# Patient Record
Sex: MALE | Race: BLACK OR AFRICAN AMERICAN | Employment: OTHER | URBAN - METROPOLITAN AREA
[De-identification: names, ages, dates, MRNs, and addresses within clinical notes are randomized per-mention and may not be internally consistent; named-entity substitution may affect disease eponyms.]

---

## 2017-01-25 ENCOUNTER — APPOINTMENT (OUTPATIENT)
Dept: GENERAL RADIOLOGY | Age: 77
End: 2017-01-25
Attending: EMERGENCY MEDICINE
Payer: MEDICARE

## 2017-01-25 ENCOUNTER — HOSPITAL ENCOUNTER (EMERGENCY)
Age: 77
Discharge: HOME | End: 2017-01-25
Attending: EMERGENCY MEDICINE | Admitting: EMERGENCY MEDICINE
Payer: MEDICARE

## 2017-01-25 VITALS
HEIGHT: 67 IN | TEMPERATURE: 97.6 F | BODY MASS INDEX: 30.45 KG/M2 | OXYGEN SATURATION: 98 % | HEART RATE: 83 BPM | SYSTOLIC BLOOD PRESSURE: 150 MMHG | RESPIRATION RATE: 20 BRPM | DIASTOLIC BLOOD PRESSURE: 80 MMHG | WEIGHT: 194 LBS

## 2017-01-25 DIAGNOSIS — J01.00 ACUTE MAXILLARY SINUSITIS, RECURRENCE NOT SPECIFIED: Primary | ICD-10-CM

## 2017-01-25 LAB
ALBUMIN SERPL BCP-MCNC: 3.8 G/DL (ref 3.4–5)
ALBUMIN/GLOB SERPL: 0.7 {RATIO} (ref 0.8–1.7)
ALP SERPL-CCNC: 101 U/L (ref 45–117)
ALT SERPL-CCNC: 17 U/L (ref 16–61)
ANION GAP BLD CALC-SCNC: 10 MMOL/L (ref 3–18)
AST SERPL W P-5'-P-CCNC: 16 U/L (ref 15–37)
BASOPHILS # BLD AUTO: 0 K/UL (ref 0–0.06)
BASOPHILS # BLD: 0 % (ref 0–2)
BILIRUB SERPL-MCNC: 0.3 MG/DL (ref 0.2–1)
BNP SERPL-MCNC: 21 PG/ML (ref 0–1800)
BUN SERPL-MCNC: 19 MG/DL (ref 7–18)
BUN/CREAT SERPL: 17 (ref 12–20)
CALCIUM SERPL-MCNC: 9.6 MG/DL (ref 8.5–10.1)
CHLORIDE SERPL-SCNC: 104 MMOL/L (ref 100–108)
CK MB CFR SERPL CALC: 1 % (ref 0–4)
CK MB SERPL-MCNC: 1 NG/ML (ref 0.5–3.6)
CK SERPL-CCNC: 96 U/L (ref 39–308)
CO2 SERPL-SCNC: 26 MMOL/L (ref 21–32)
CREAT SERPL-MCNC: 1.14 MG/DL (ref 0.6–1.3)
DIFFERENTIAL METHOD BLD: ABNORMAL
EOSINOPHIL # BLD: 0.5 K/UL (ref 0–0.4)
EOSINOPHIL NFR BLD: 4 % (ref 0–5)
ERYTHROCYTE [DISTWIDTH] IN BLOOD BY AUTOMATED COUNT: 14.6 % (ref 11.6–14.5)
FLUAV AG NPH QL IA: NEGATIVE
FLUBV AG NOSE QL IA: NEGATIVE
GLOBULIN SER CALC-MCNC: 5.1 G/DL (ref 2–4)
GLUCOSE SERPL-MCNC: 99 MG/DL (ref 74–99)
HCT VFR BLD AUTO: 46.4 % (ref 36–48)
HGB BLD-MCNC: 15.8 G/DL (ref 13–16)
LYMPHOCYTES # BLD AUTO: 25 % (ref 21–52)
LYMPHOCYTES # BLD: 2.9 K/UL (ref 0.9–3.6)
MCH RBC QN AUTO: 30.9 PG (ref 24–34)
MCHC RBC AUTO-ENTMCNC: 34.1 G/DL (ref 31–37)
MCV RBC AUTO: 90.6 FL (ref 74–97)
MONOCYTES # BLD: 0.9 K/UL (ref 0.05–1.2)
MONOCYTES NFR BLD AUTO: 8 % (ref 3–10)
NEUTS SEG # BLD: 7.3 K/UL (ref 1.8–8)
NEUTS SEG NFR BLD AUTO: 63 % (ref 40–73)
PLATELET # BLD AUTO: 168 K/UL (ref 135–420)
PMV BLD AUTO: 12.7 FL (ref 9.2–11.8)
POTASSIUM SERPL-SCNC: 3.9 MMOL/L (ref 3.5–5.5)
PROT SERPL-MCNC: 8.9 G/DL (ref 6.4–8.2)
RBC # BLD AUTO: 5.12 M/UL (ref 4.7–5.5)
SODIUM SERPL-SCNC: 140 MMOL/L (ref 136–145)
TROPONIN I SERPL-MCNC: <0.02 NG/ML (ref 0–0.06)
WBC # BLD AUTO: 11.6 K/UL (ref 4.6–13.2)

## 2017-01-25 PROCEDURE — 82550 ASSAY OF CK (CPK): CPT | Performed by: PHYSICIAN ASSISTANT

## 2017-01-25 PROCEDURE — 85025 COMPLETE CBC W/AUTO DIFF WBC: CPT | Performed by: PHYSICIAN ASSISTANT

## 2017-01-25 PROCEDURE — 71020 XR CHEST PA LAT: CPT

## 2017-01-25 PROCEDURE — 83880 ASSAY OF NATRIURETIC PEPTIDE: CPT | Performed by: PHYSICIAN ASSISTANT

## 2017-01-25 PROCEDURE — 96374 THER/PROPH/DIAG INJ IV PUSH: CPT

## 2017-01-25 PROCEDURE — 93005 ELECTROCARDIOGRAM TRACING: CPT

## 2017-01-25 PROCEDURE — 99284 EMERGENCY DEPT VISIT MOD MDM: CPT

## 2017-01-25 PROCEDURE — 74011250636 HC RX REV CODE- 250/636: Performed by: PHYSICIAN ASSISTANT

## 2017-01-25 PROCEDURE — 80053 COMPREHEN METABOLIC PANEL: CPT | Performed by: PHYSICIAN ASSISTANT

## 2017-01-25 PROCEDURE — 87804 INFLUENZA ASSAY W/OPTIC: CPT | Performed by: PHYSICIAN ASSISTANT

## 2017-01-25 PROCEDURE — 96361 HYDRATE IV INFUSION ADD-ON: CPT

## 2017-01-25 RX ORDER — AMOXICILLIN 875 MG/1
875 TABLET, FILM COATED ORAL 2 TIMES DAILY
COMMUNITY
End: 2017-01-25

## 2017-01-25 RX ORDER — PREDNISONE 20 MG/1
60 TABLET ORAL DAILY
Qty: 15 TAB | Refills: 0 | Status: SHIPPED | OUTPATIENT
Start: 2017-01-25 | End: 2017-01-30

## 2017-01-25 RX ORDER — SODIUM CHLORIDE 9 MG/ML
500 INJECTION, SOLUTION INTRAVENOUS ONCE
Status: COMPLETED | OUTPATIENT
Start: 2017-01-25 | End: 2017-01-25

## 2017-01-25 RX ORDER — ALBUTEROL SULFATE 90 UG/1
2 AEROSOL, METERED RESPIRATORY (INHALATION)
Qty: 1 INHALER | Refills: 1 | Status: SHIPPED | OUTPATIENT
Start: 2017-01-25

## 2017-01-25 RX ORDER — AMOXICILLIN AND CLAVULANATE POTASSIUM 875; 125 MG/1; MG/1
1 TABLET, FILM COATED ORAL 2 TIMES DAILY
Qty: 20 TAB | Refills: 0 | Status: SHIPPED | OUTPATIENT
Start: 2017-01-25 | End: 2017-02-04

## 2017-01-25 RX ADMIN — METHYLPREDNISOLONE SODIUM SUCCINATE 125 MG: 125 INJECTION, POWDER, FOR SOLUTION INTRAMUSCULAR; INTRAVENOUS at 11:35

## 2017-01-25 RX ADMIN — SODIUM CHLORIDE 500 ML: 900 INJECTION, SOLUTION INTRAVENOUS at 11:35

## 2017-01-25 NOTE — ED NOTES
Patient seated at bedside with continuous monitor in place, statess he cant breathe through his nose when he lays down.

## 2017-01-25 NOTE — ED PROVIDER NOTES
HPI Comments: 11:13 AM   Derek Patel is a 68 y.o. male with a h/o of HTN and high cholesterol presenting to the ED C/O worsening congestion and dry cough x 1 week. Pt believes his congestion is caused by an infected wisdom tooth. Pt saw his PCP, Jaja Montenegro MD, 2 days ago and was rx Amoxicillin. Pt also reports SOB onset for 6 months. Sx worsened by exertion. Pt states he was seen at [facility] in 9/2016 for these sx and was told he had a mini stroke. He was given a CT of head/chest and a CXR, which were all normal. Pt was a tobacco user, quit 25 years ago. Denies EtOH use. Denies rhinorrhea, fevers, wheezing, hx of blood clots and any other sx or complaints. Patient is a 68 y.o. male presenting with sinus problems. The history is provided by the patient. No  was used. Sinus Infection    This is a new problem. The current episode started 2 days ago. The problem has been gradually worsening. There has been no fever. The pain is at a severity of 0/10. The patient is experiencing no pain. Associated symptoms include congestion, cough (dry) and shortness of breath (chronic). Pertinent negatives include no chills. He has tried antibiotics for the symptoms. The treatment provided no relief. Past Medical History:   Diagnosis Date    Hypertension        History reviewed. No pertinent past surgical history. History reviewed. No pertinent family history. Social History     Social History    Marital status:      Spouse name: N/A    Number of children: N/A    Years of education: N/A     Occupational History    Not on file. Social History Main Topics    Smoking status: Never Smoker    Smokeless tobacco: Not on file    Alcohol use No    Drug use: Not on file    Sexual activity: Not on file     Other Topics Concern    Not on file     Social History Narrative         ALLERGIES: Review of patient's allergies indicates no known allergies.     Review of Systems Constitutional: Negative for chills. HENT: Positive for congestion and dental problem (infected wisdom tooth). Respiratory: Positive for cough (dry) and shortness of breath (chronic). Cardiovascular: Negative for palpitations. Gastrointestinal: Negative for nausea. Genitourinary: Negative for dysuria, flank pain and frequency. Musculoskeletal: Negative for arthralgias, joint swelling and myalgias. Skin: Negative for color change and wound. Neurological: Negative for dizziness, weakness and light-headedness. Hematological: Negative for adenopathy. Vitals:    01/25/17 1013 01/25/17 1209   BP: 156/89 150/80   Pulse: 88 83   Resp: 20 20   Temp: 97.6 °F (36.4 °C)    SpO2: 100% 98%   Weight: 88 kg (194 lb)    Height: 5' 7\" (1.702 m)             Physical Exam   Constitutional: He is oriented to person, place, and time. He appears well-developed and well-nourished. No distress. AA geriatric male in NAD. Alert. No resp distress or acc muscle use. HENT:   Head: Normocephalic and atraumatic. Head is without right periorbital erythema and without left periorbital erythema. Right Ear: External ear normal. No drainage or swelling. Tympanic membrane is not perforated, not erythematous and not bulging. Left Ear: External ear normal. No drainage or swelling. Tympanic membrane is not perforated, not erythematous and not bulging. Nose: Mucosal edema and rhinorrhea present. Right sinus exhibits maxillary sinus tenderness. Right sinus exhibits no frontal sinus tenderness. Left sinus exhibits maxillary sinus tenderness. Left sinus exhibits no frontal sinus tenderness. Mouth/Throat: Uvula is midline, oropharynx is clear and moist and mucous membranes are normal. No oral lesions. No trismus in the jaw. Abnormal dentition. Dental caries present. No dental abscesses or uvula swelling. No oropharyngeal exudate, posterior oropharyngeal edema, posterior oropharyngeal erythema or tonsillar abscesses. Overall poor dentition. Several edentulous gingival areas. No abscess appreciated. Eyes: Conjunctivae are normal. Right eye exhibits no discharge. Left eye exhibits no discharge. Neck: Normal range of motion. Neck supple. Cardiovascular: Normal rate, regular rhythm, normal heart sounds and intact distal pulses. Exam reveals no gallop and no friction rub. No murmur heard. Pulmonary/Chest: Effort normal and breath sounds normal. No accessory muscle usage. No tachypnea. No respiratory distress. He has no decreased breath sounds. He has no wheezes. He has no rhonchi. He has no rales. Abdominal: Soft. Musculoskeletal: Normal range of motion. He exhibits no edema or tenderness. Lymphadenopathy:     He has no cervical adenopathy. Neurological: He is alert and oriented to person, place, and time. Skin: Skin is warm and dry. He is not diaphoretic. Psychiatric: He has a normal mood and affect. Judgment normal.   Nursing note and vitals reviewed. RESULTS:    EKG interpretation: (Preliminary)  NSR at 93 bpm. Possible left atrial enlargement. RBBB. No STEMI. EKG read by Liudmila Oakley PA-C at 10:31 AM.    XR CHEST PA LAT   Final Result    1. Poor inspiratory effort. There may be developing subsegmental atelectasis  both lower lobes. 2. Otherwise unremarkable. As read by the radiologist.           Labs Reviewed   CBC WITH AUTOMATED DIFF - Abnormal; Notable for the following:        Result Value    RDW 14.6 (*)     MPV 12.7 (*)     ABS. EOSINOPHILS 0.5 (*)     All other components within normal limits   METABOLIC PANEL, COMPREHENSIVE - Abnormal; Notable for the following:     BUN 19 (*)     Protein, total 8.9 (*)     Globulin 5.1 (*)     A-G Ratio 0.7 (*)     All other components within normal limits   INFLUENZA A & B AG (RAPID TEST)   CARDIAC PANEL,(CK, CKMB & TROPONIN)   PRO-BNP       No results found for this or any previous visit (from the past 12 hour(s)).     MDM  Number of Diagnoses or Management Options  Acute maxillary sinusitis, recurrence not specified:   Diagnosis management comments: URI, sinusitis, influenza, COPD, PNA, bronchitis, allergic, CHF. Doubt cardiac or PE       Amount and/or Complexity of Data Reviewed  Tests in the radiology section of CPT®: reviewed and ordered (CXR)  Tests in the medicine section of CPT®: reviewed and ordered (EKG)  Independent visualization of images, tracings, or specimens: yes (EKG, CXR)      ED Course     Medications   methylPREDNISolone (PF) (SOLU-MEDROL) injection 125 mg (125 mg IntraVENous Given 1/25/17 1135)   0.9% sodium chloride infusion 500 mL (0 mL IntraVENous IV Completed 1/25/17 1215)       Procedures    PROGRESS NOTE:   11:13 AM  Initial assessment completed. Written by Melody Carroll ED Scribe, as dictated by Yumiko Fall PA-C. PROGRESS NOTE:   1:20 PM  Pt has been re-examined by Yumiko Fall PA-C. Pt feels better with treatment. Lungs CTAB. No resp distress or acc muscle use. Will tx for acute sinusitis. Will discharge home and give FU with PCP. Reasons to RTED discussed with pt. All questions answered. Pt feels comfortable going home at this time. Pt expressed understanding and he agrees with plan. Written by ELKE Ramirezibe, as dictated by Yumiko Fall PA-C.    DISCHARGE NOTE:  1:27 PM  Chaim Valdes's  results have been reviewed with him. He has been counseled regarding his diagnosis, treatment, and plan. He verbally conveys understanding and agreement of the signs, symptoms, diagnosis, treatment and prognosis and additionally agrees to follow up as discussed. He also agrees with the care-plan and conveys that all of his questions have been answered. I have also provided discharge instructions for him that include: educational information regarding their diagnosis and treatment, and list of reasons why they would want to return to the ED prior to their follow-up appointment, should his condition change.  The patient and/or family have been provided with education for proper Emergency Department utilization. CLINICAL IMPRESSION:    1. Acute maxillary sinusitis, recurrence not specified        AFTER VISIT PLAN:    Discharge Medication List as of 1/25/2017  1:27 PM      START taking these medications    Details   amoxicillin-clavulanate (AUGMENTIN) 875-125 mg per tablet Take 1 Tab by mouth two (2) times a day for 10 days. Take with daily yogurt or probiotic. Take with food. , Print, Disp-20 Tab, R-0      predniSONE (DELTASONE) 20 mg tablet Take 3 Tabs by mouth daily for 5 days. Start tomorrow with breakfast, Print, Disp-15 Tab, R-0      albuterol (PROVENTIL HFA, VENTOLIN HFA, PROAIR HFA) 90 mcg/actuation inhaler Take 2 Puffs by inhalation every four (4) hours as needed for Wheezing or Shortness of Breath., Print, Disp-1 Inhaler, R-1      inhalational spacing device 1 Each by Does Not Apply route as needed. Please dispense one adult spacer to be used with albuterol HFA. , Print, Disp-1 Device, R-0         STOP taking these medications       amoxicillin (AMOXIL) 875 mg tablet Comments:   Reason for Stopping: Follow-up Information     Follow up With Details Comments Contact Ofelia Hernandez MD Schedule an appointment as soon as possible for a visit in 2 days Follow up with your PCP. [id number] [address]  [phone]      [facility] EM[facility]  As needed, If symptoms worsen [address]  [phone]           Attestations: This note is prepared by Sharon Salazar, acting as Scribe for Liudmila Oakley PA-C. Liudmila Oakley PA-C: The scribe's documentation has been prepared under my direction and personally reviewed by me in its entirety. I confirm that the note above accurately reflects all work, treatment, procedures, and medical decision making performed by me.

## 2017-01-25 NOTE — ED TRIAGE NOTES
Patient with complaints of shortness of breath and an infected wisdom tooth that is causing a sinus infection. Patient states that he saw his  Monday and was placed on antibiotics. Sepsis Screening completed    (  )Patient meets SIRS criteria. (x  )Patient does not meet SIRS criteria.       SIRS Criteria is achieved when two or more of the following are present   Temperature < 96.8°F (36°C) or > 100.9°F (38.3°C)   Heart Rate > 90 beats per minute   Respiratory Rate > 20 beats per minute   WBC count > 12,000 or <4,000 or > 10% bands

## 2017-01-25 NOTE — ED NOTES
Discharge paperwork given to patient. Patient verbalizes understanding. Armband removed and shredded. Patient educated regarding pain medication, dosage, administration and side effects. Patient educated non-pharmacological pain intervention therapies. Patient given discharge instruction regarding antibiotic therapy. Advised of dosage and administrations per day. Advised to finish full course of antibiotics.

## 2017-01-25 NOTE — DISCHARGE INSTRUCTIONS
Sinusitis: Care Instructions  Your Care Instructions    Sinusitis is an infection of the lining of the sinus cavities in your head. Sinusitis often follows a cold. It causes pain and pressure in your head and face. In most cases, sinusitis gets better on its own in 1 to 2 weeks. But some mild symptoms may last for several weeks. Sometimes antibiotics are needed. Follow-up care is a key part of your treatment and safety. Be sure to make and go to all appointments, and call your doctor if you are having problems. It's also a good idea to know your test results and keep a list of the medicines you take. How can you care for yourself at home? · Take an over-the-counter pain medicine, such as acetaminophen (Tylenol), ibuprofen (Advil, Motrin), or naproxen (Aleve). Read and follow all instructions on the label. · If the doctor prescribed antibiotics, take them as directed. Do not stop taking them just because you feel better. You need to take the full course of antibiotics. · Be careful when taking over-the-counter cold or flu medicines and Tylenol at the same time. Many of these medicines have acetaminophen, which is Tylenol. Read the labels to make sure that you are not taking more than the recommended dose. Too much acetaminophen (Tylenol) can be harmful. · Breathe warm, moist air from a steamy shower, a hot bath, or a sink filled with hot water. Avoid cold, dry air. Using a humidifier in your home may help. Follow the directions for cleaning the machine. · Use saline (saltwater) nasal washes to help keep your nasal passages open and wash out mucus and bacteria. You can buy saline nose drops at a grocery store or drugstore. Or you can make your own at home by adding 1 teaspoon of salt and 1 teaspoon of baking soda to 2 cups of distilled water. If you make your own, fill a bulb syringe with the solution, insert the tip into your nostril, and squeeze gently. [place] your nose.   · Put a hot, wet towel or a warm gel pack on your face 3 or 4 times a day for 5 to 10 minutes each time. · Try a decongestant nasal spray like oxymetazoline (Afrin). Do not use it for more than 3 days in a row. Using it for more than 3 days can make your congestion worse. When should you call for help? Call your doctor now or seek immediate medical care if:  · You have new or worse swelling or redness in your face or around your eyes. · You have a new or higher fever. Watch closely for changes in your health, and be sure to contact your doctor if:  · You have new or worse facial pain. · The mucus from your nose becomes thicker (like pus) or has new blood in it. · You are not getting better as expected. Where can you learn more? Go to http://linh-mireya.info/. Enter L632 in the search box to learn more about \"Sinusitis: Care Instructions. \"  Current as of: July 29, 2016  Content Version: 11.1  © 20060754-5017 [employer]. Care instructions adapted under license by [employer] (which disclaims liability or warranty for this information). If you have questions about a medical condition or this instruction, always ask your healthcare professional. [place] [address] any warranty or liability for your use of this information.

## 2017-01-26 LAB
ATRIAL RATE: 93 BPM
CALCULATED P AXIS, ECG09: 59 DEGREES
CALCULATED R AXIS, ECG10: -71 DEGREES
CALCULATED T AXIS, ECG11: 30 DEGREES
DIAGNOSIS, 93000: NORMAL
P-R INTERVAL, ECG05: 168 MS
Q-T INTERVAL, ECG07: 372 MS
QRS DURATION, ECG06: 138 MS
QTC CALCULATION (BEZET), ECG08: 462 MS
VENTRICULAR RATE, ECG03: 93 BPM

## 2018-03-01 ENCOUNTER — HOSPITAL ENCOUNTER (EMERGENCY)
Age: 78
Discharge: HOME | End: 2018-03-01
Attending: EMERGENCY MEDICINE
Payer: MEDICARE

## 2018-03-01 VITALS
DIASTOLIC BLOOD PRESSURE: 65 MMHG | BODY MASS INDEX: 27.47 KG/M2 | HEIGHT: 67 IN | WEIGHT: 175 LBS | HEART RATE: 71 BPM | SYSTOLIC BLOOD PRESSURE: 132 MMHG | OXYGEN SATURATION: 100 % | RESPIRATION RATE: 16 BRPM | TEMPERATURE: 96.9 F

## 2018-03-01 DIAGNOSIS — J30.89 CHRONIC NON-SEASONAL ALLERGIC RHINITIS, UNSPECIFIED TRIGGER: Primary | ICD-10-CM

## 2018-03-01 PROCEDURE — 99283 EMERGENCY DEPT VISIT LOW MDM: CPT

## 2018-03-01 RX ORDER — DIPHENHYDRAMINE HCL 25 MG
50 CAPSULE ORAL
Qty: 40 CAP | Refills: 1 | Status: SHIPPED | OUTPATIENT
Start: 2018-03-01 | End: 2020-12-11

## 2018-03-01 RX ORDER — FLUTICASONE PROPIONATE 50 MCG
2 SPRAY, SUSPENSION (ML) NASAL DAILY
Qty: 1 BOTTLE | Refills: 0 | Status: SHIPPED | OUTPATIENT
Start: 2018-03-01 | End: 2020-12-11

## 2018-03-01 NOTE — ED PROVIDER NOTES
EMERGENCY DEPARTMENT HISTORY AND PHYSICAL EXAM    Date: 3/1/2018  Patient Name: Shanel Salazar    History of Presenting Illness     Chief Complaint   Patient presents with    Nasal Congestion         History Provided By: Patient    Chief Complaint: Nasal congestion  Duration: A few months   Timing:  Constant  Location: Nose  Associated Symptoms: rhinorrhea    Additional History (Context):   5:07 AM  Shanel Salazar is a 68 y.o. male with PMHx HTN who presents ambulatory to the emergency department C/O nasal congestion, onset a few months. Notes no relief with OTC congestion medications. Associated sxs include rhinorrhea. He reports that his congestion often wakes him up in the night. Notes FHX of HTN (mother). Pt denies painful swallowing, tobacco/EtOH/illicit drug use, or any other sxs or complaints. PCP: None    Current Outpatient Prescriptions   Medication Sig Dispense Refill    DEXTROMETHORPHAN HBR (THERAFLU COUGH PO) Take  by mouth.  diphenhydrAMINE (BENADRYL) 25 mg capsule Take 2 Caps by mouth nightly as needed. Take 1-2 tabs every 6 hours as needed. 40 Cap 1    fluticasone (FLONASE) 50 mcg/actuation nasal spray 2 Sprays by Both Nostrils route daily. 1 Bottle 0    albuterol (PROVENTIL HFA, VENTOLIN HFA, PROAIR HFA) 90 mcg/actuation inhaler Take 2 Puffs by inhalation every four (4) hours as needed for Wheezing or Shortness of Breath. 1 Inhaler 1    inhalational spacing device 1 Each by Does Not Apply route as needed. Please dispense one adult spacer to be used with albuterol HFA. 1 Device 0       Past History     Past Medical History:  Past Medical History:   Diagnosis Date    Hypertension        Past Surgical History:  History reviewed. No pertinent surgical history. Family History:  History reviewed. No pertinent family history.     Social History:  Social History   Substance Use Topics    Smoking status: Never Smoker    Smokeless tobacco: None    Alcohol use No       Allergies:  No Known Allergies      Review of Systems   Review of Systems   Constitutional: Negative for chills, diaphoresis, fever and unexpected weight change. HENT: Positive for congestion and rhinorrhea. Negative for drooling, ear pain, sore throat, tinnitus and trouble swallowing. Eyes: Negative for photophobia, pain, redness and visual disturbance. Respiratory: Negative for cough, choking, chest tightness, shortness of breath, wheezing and stridor. Cardiovascular: Negative for chest pain, palpitations and leg swelling. Gastrointestinal: Negative for abdominal distention, abdominal pain, anal bleeding, blood in stool, constipation, diarrhea, nausea and vomiting. Genitourinary: Negative for difficulty urinating, dysuria, flank pain, frequency, hematuria and urgency. Musculoskeletal: Negative for arthralgias, back pain and neck pain. Skin: Negative for color change, rash and wound. Neurological: Negative for dizziness, seizures, syncope, speech difficulty, light-headedness and headaches. Hematological: Does not bruise/bleed easily. Psychiatric/Behavioral: Negative for agitation, behavioral problems, hallucinations, self-injury and suicidal ideas. The patient is not hyperactive. Physical Exam     Vitals:    03/01/18 0515   BP: 132/65   Pulse: 71   Resp: 16   Temp: 96.9 °F (36.1 °C)   SpO2: 100%   Weight: 79.4 kg (175 lb)   Height: 5' 7\" (1.702 m)     Physical Exam   Constitutional: He is oriented to person, place, and time. He appears well-developed and well-nourished. No distress. HENT:   Head: Normocephalic and atraumatic. Right Ear: External ear normal.   Left Ear: External ear normal.   Mouth/Throat: Oropharynx is clear and moist. No oropharyngeal exudate. Pink hypertrophy with copious white discharge. Eyes: Conjunctivae and EOM are normal. Pupils are equal, round, and reactive to light. No scleral icterus. No pallor   Neck: Normal range of motion. Neck supple. No JVD present.  No tracheal deviation present. No thyromegaly present. Cardiovascular: Normal rate, regular rhythm and normal heart sounds. Pulmonary/Chest: Effort normal and breath sounds normal. No stridor. No respiratory distress. Abdominal: Soft. Bowel sounds are normal. He exhibits no distension. There is no tenderness. There is no rebound and no guarding. Musculoskeletal: Normal range of motion. He exhibits no edema or tenderness. No soft tissue injuries   Lymphadenopathy:     He has no cervical adenopathy. Neurological: He is alert and oriented to person, place, and time. He has normal reflexes. No cranial nerve deficit. Coordination normal.   Skin: Skin is warm and dry. No rash noted. He is not diaphoretic. No erythema. Psychiatric: He has a normal mood and affect. His behavior is normal. Judgment and thought content normal.   Nursing note and vitals reviewed. Diagnostic Study Results     Labs -   No results found for this or any previous visit (from the past 12 hour(s)). Radiologic Studies -    No orders to display     CT Results  (Last 48 hours)    None        CXR Results  (Last 48 hours)    None            Medical Decision Making   I am the first provider for this patient. I reviewed the vital signs, available nursing notes, past medical history, past surgical history, family history and social history. Vital Signs-Reviewed the patient's vital signs. Pulse Oximetry Analysis - 100% on RA     Records Reviewed: Nursing Notes    Provider Notes (Medical Decision Making):   Ddx: He has chronic sxs of allergies more than infection, worse at night, and no findings to suggest infection, flu, or significant infectious disease    Procedures:  Procedures    ED Course:   5:07 AM   Initial assessment performed. The patients presenting problems have been discussed, and they are in agreement with the care plan formulated and outlined with them.   I have encouraged them to ask questions as they arise throughout their visit.    Diagnosis and Disposition       DISCHARGE NOTE:  5:19 AM  Valeriy Welch has been counseled regarding his diagnosis, treatment, and plan. He verbally conveys understanding and agreement of the signs, symptoms, diagnosis, treatment and prognosis and additionally agrees to follow up as discussed. He also agrees with the care-plan and conveys that all of his questions have been answered. I have also provided discharge instructions for him that include: educational information regarding their diagnosis and treatment, and list of reasons why they would want to return to the ED prior to their follow-up appointment, should his condition change. He has been provided with education for proper emergency department utilization. CLINICAL IMPRESSION:    1. Chronic non-seasonal allergic rhinitis, unspecified trigger        PLAN:  1. D/C Home  2. Current Discharge Medication List      START taking these medications    Details   diphenhydrAMINE (BENADRYL) 25 mg capsule Take 2 Caps by mouth nightly as needed. Take 1-2 tabs every 6 hours as needed. Qty: 40 Cap, Refills: 1      fluticasone (FLONASE) 50 mcg/actuation nasal spray 2 Sprays by Both Nostrils route daily. Qty: 1 Bottle, Refills: 0           3. Follow-up Information     Follow up With Details Comments Ata Harding MD Schedule an appointment as soon as possible for a visit in 2 days for PCP follow up [phone] [id number]  [phone]      [facility] EMERGENCY DEPT  As needed, If symptoms worsen [address] [place] [id number]  [phone]        _______________________________    Attestations: This note is prepared by Pura Lyles, acting as Scribe for Maddi. Jennifer Chung MD.    Maddi. Jennifer Chung MD:  The scribe's documentation has been prepared under my direction and personally reviewed by me in its entirety.   I confirm that the note above accurately reflects all work, treatment, procedures, and medical decision making performed by me.  _______________________________

## 2018-03-01 NOTE — DISCHARGE INSTRUCTIONS
Rhinitis: Care Instructions  Your Care Instructions  Rhinitis is swelling and irritation in the nose. Allergies and infections are often the cause. Your nose may run or feel stuffy. Other symptoms are itchy and sore eyes, ears, throat, and mouth. If allergies are the cause, your doctor may do tests to find out what you are allergic to. You may be able to stop symptoms if you avoid the things that cause them. Your doctor may suggest or prescribe medicine to ease your symptoms. Follow-up care is a key part of your treatment and safety. Be sure to make and go to all appointments, and call your doctor if you are having problems. It's also a good idea to know your test results and keep a list of the medicines you take. How can you care for yourself at home? · If your rhinitis is caused by allergies, try to find out what sets off (triggers) your symptoms. Take steps to avoid these triggers. ¨ Avoid yard work. It can stir up both pollen and mold. ¨ Do not smoke or allow others to smoke around you. If you need help quitting, talk to your doctor about stop-smoking programs and medicines. These can increase your chances of quitting for good. ¨ Do not use aerosol sprays, cleaning products, or perfumes. ¨ If pollen is one of your triggers, close your house and car windows during blooming season. ¨ Clean your house often to control dust.  ¨ Keep pets outside. · If your doctor recommends over-the-counter medicines to relieve symptoms, take your medicines exactly as prescribed. Call your doctor if you think you are having a problem with your medicine. · Use saline (saltwater) nasal washes to help keep your nasal passages open and wash out mucus and bacteria. You can buy saline nose drops at a grocery store or drugstore. Or you can make your own at home by adding 1 teaspoon of salt and 1 teaspoon of baking soda to 2 cups of distilled water.  If you make your own, fill a bulb syringe with the solution, insert the tip into your nostril, and squeeze gently. Berta Silva your nose. When should you call for help? Call your doctor now or seek immediate medical care if:  ? · You are having trouble breathing. ? Watch closely for changes in your health, and be sure to contact your doctor if:  ? · Mucus from your nose gets thicker (like pus) or has new blood in it. ? · You have new or worse symptoms. ? · You do not get better as expected. Where can you learn more? Go to http://linh-mireya.info/. Enter M030 in the search box to learn more about \"Rhinitis: Care Instructions. \"  Current as of: May 12, 2017  Content Version: 11.4  © 3229-6583 Healthwise, [employer]. Care instructions adapted under license by [employer] (which disclaims liability or warranty for this information). If you have questions about a medical condition or this instruction, always ask your healthcare professional. [address] any warranty or liability for your use of this information.

## 2020-12-02 ENCOUNTER — APPOINTMENT (OUTPATIENT)
Dept: GENERAL RADIOLOGY | Age: 80
DRG: 177 | End: 2020-12-02
Attending: EMERGENCY MEDICINE
Payer: MEDICARE

## 2020-12-02 ENCOUNTER — HOSPITAL ENCOUNTER (EMERGENCY)
Age: 80
Discharge: HOME | DRG: 177 | End: 2020-12-02
Attending: EMERGENCY MEDICINE | Admitting: EMERGENCY MEDICINE
Payer: MEDICARE

## 2020-12-02 VITALS
SYSTOLIC BLOOD PRESSURE: 129 MMHG | RESPIRATION RATE: 14 BRPM | WEIGHT: 180 LBS | OXYGEN SATURATION: 100 % | HEIGHT: 67 IN | HEART RATE: 82 BPM | BODY MASS INDEX: 28.25 KG/M2 | TEMPERATURE: 97.5 F | DIASTOLIC BLOOD PRESSURE: 49 MMHG

## 2020-12-02 DIAGNOSIS — R05.9 COUGH: Primary | ICD-10-CM

## 2020-12-02 DIAGNOSIS — R52 BODY ACHES: ICD-10-CM

## 2020-12-02 DIAGNOSIS — Z20.822 SUSPECTED COVID-19 VIRUS INFECTION: ICD-10-CM

## 2020-12-02 LAB
FLUAV AG NPH QL IA: NEGATIVE
FLUBV AG NOSE QL IA: NEGATIVE

## 2020-12-02 PROCEDURE — 87635 SARS-COV-2 COVID-19 AMP PRB: CPT

## 2020-12-02 PROCEDURE — 99282 EMERGENCY DEPT VISIT SF MDM: CPT

## 2020-12-02 PROCEDURE — 87804 INFLUENZA ASSAY W/OPTIC: CPT

## 2020-12-02 PROCEDURE — 71046 X-RAY EXAM CHEST 2 VIEWS: CPT

## 2020-12-02 RX ORDER — METHYLPREDNISOLONE 4 MG/1
TABLET ORAL
Qty: 1 DOSE PACK | Refills: 0 | Status: SHIPPED | OUTPATIENT
Start: 2020-12-02 | End: 2020-12-11

## 2020-12-02 RX ORDER — AZITHROMYCIN 250 MG/1
TABLET, FILM COATED ORAL
Qty: 6 TAB | Refills: 0 | Status: SHIPPED | OUTPATIENT
Start: 2020-12-02 | End: 2020-12-11

## 2020-12-02 RX ORDER — ALBUTEROL SULFATE 90 UG/1
1 AEROSOL, METERED RESPIRATORY (INHALATION)
Qty: 1 INHALER | Refills: 0 | Status: SHIPPED | OUTPATIENT
Start: 2020-12-02

## 2020-12-02 NOTE — LETTER
12/4/2020 Mino Pacheco [address] [place] [id number] Dear  Roberto Diana, You were recently seen in the Emergency Department of [facility] [address] and had lab work performed. We would like to discuss these results with you. Please call the Emergency Department at your earliest convenience at [phone] between 10am-8pm to speak with one of our providers. Sincerely, Physician Emergency, MD 
 
 
[address] 
[phone]

## 2020-12-02 NOTE — ED TRIAGE NOTES
Pt arrives ambulatory to ED with c\o \"I just don't feel good, I had the flu one time and it felt like this\"

## 2020-12-02 NOTE — ED PROVIDER NOTES
EMERGENCY DEPARTMENT HISTORY AND PHYSICAL EXAM    Date: 12/2/2020  Patient Name: Raegan Viera    History of Presenting Illness     Chief Complaint   Patient presents with    Flu Like Symptoms    Generalized Body Aches         History Provided By: Patient    Raegan Viera is a [de-identified] y.o. male who presents to the emergency department C/O flu like symptoms, generalized body aches. Patient states . PCP: None    Current Outpatient Medications   Medication Sig Dispense Refill    azithromycin (Zithromax Z-Cilfton) 250 mg tablet Take 2 tabs on the first day then 1 tab daily for the remaining 4 days. 6 Tab 0    methylPREDNISolone (Medrol, Clifton,) 4 mg tablet Take as directed 1 Dose Pack 0    albuterol (PROVENTIL HFA, VENTOLIN HFA, PROAIR HFA) 90 mcg/actuation inhaler Take 1 Puff by inhalation every four (4) hours as needed for Wheezing. 1 Inhaler 0    DEXTROMETHORPHAN HBR (THERAFLU COUGH PO) Take  by mouth.  diphenhydrAMINE (BENADRYL) 25 mg capsule Take 2 Caps by mouth nightly as needed. Take 1-2 tabs every 6 hours as needed. 40 Cap 1    fluticasone (FLONASE) 50 mcg/actuation nasal spray 2 Sprays by Both Nostrils route daily. 1 Bottle 0    albuterol (PROVENTIL HFA, VENTOLIN HFA, PROAIR HFA) 90 mcg/actuation inhaler Take 2 Puffs by inhalation every four (4) hours as needed for Wheezing or Shortness of Breath. 1 Inhaler 1    inhalational spacing device 1 Each by Does Not Apply route as needed. Please dispense one adult spacer to be used with albuterol HFA. 1 Device 0       Past History     Past Medical History:  Past Medical History:   Diagnosis Date    Hypertension        Past Surgical History:  History reviewed. No pertinent surgical history. Family History:  History reviewed. No pertinent family history.     Social History:  Social History     Tobacco Use    Smoking status: Never Smoker    Smokeless tobacco: Never Used   Substance Use Topics    Alcohol use: No    Drug use: Not on file Allergies:  No Known Allergies      Review of Systems   Review of Systems   Constitutional: Negative for fever. Respiratory: Positive for cough. Negative for shortness of breath. Musculoskeletal: Positive for arthralgias and myalgias. Skin: Negative for rash. All other systems reviewed and are negative. All other systems reviewed and are negative.     Physical Exam     Vitals:    12/02/20 1152   BP: (!) 129/49   Pulse: 82   Resp: 14   Temp: 97.5 °F (36.4 °C)   SpO2: 100%   Weight: 81.6 kg (180 lb)   Height: 5' 7\" (1.702 m)     Physical Exam    Nursing notes and vital signs reviewed    Airway: intact, speaking normally  Breathing: No apparent distress, no cyanosis  Circulation: Peripheral pulses equal    Constitutional: Non toxic appearing, no acute distress, appearing stated age  [de-identified]:  Head: Normocephalic, Atraumatic  Eyes: PERRL, EOMI, No conjunctival injection  Ears: external ears normal  Nose: No rhinorrhea, external nose normal  Throat: mucous membranes moist  Neck: symmetric, trachea midline, no obvious swelling, no JVD  Cardiovascular: Regular rate and rhythm, no murmurs  Lungs: Clear to ausculation bilaterally, No stridor, Normal work of breathing and chest excursion bilaterally  Abdomen: Soft, non tender, non distended, normoactive bowel sounds, No rigidity, no peritoneal signs  Musculoskeletal:  No evidence of obvious deformity to the back, neck or extremities, no LE edema  Skin: Warm, dry, No obvious rashes  Neuro: Alert and oriented x 3, CN 2-12 intact, normal speech, strength and sensation full and symmetric bilaterally  Psychiatric: Normal mood and affect      Diagnostic Study Results     Labs -     Recent Results (from the past 72 hour(s))   INFLUENZA A & B AG (RAPID TEST)    Collection Time: 12/02/20  1:15 PM   Result Value Ref Range    Influenza A Antigen Negative NEG      Influenza B Antigen Negative NEG     SARS-COV-2    Collection Time: 12/02/20  1:15 PM   Result Value Ref Range SARS-CoV-2 PENDING     Specimen source Nasopharyngeal      Specimen type NP Swab         Radiologic Studies -   XR CHEST PA LAT   Final Result   IMPRESSION:      1. Stable exam. No acute cardiopulmonary process. CT Results  (Last 48 hours)    None        CXR Results  (Last 48 hours)               12/02/20 1339  XR CHEST PA LAT Final result    Impression:  IMPRESSION:       1. Stable exam. No acute cardiopulmonary process. Narrative:  EXAM: CHEST X-RAY         Technique:  Frontal and Lateral views of the chest.       History: Cough       Comparison: 1/25/2017, 1/4/2015       _______________       FINDINGS:       HEART AND MEDIASTINUM: Midline cardiac silhouette, normal in size. Unremarkable   hilar vascular structures. LUNGS AND PLEURAL SPACES: Stable appearing bilateral vertically oriented   peripheral mid lung opacities, potentially chronic pleural scarring and/or   thickening. No developing focal consolidation, parenchymal opacity. No   pneumothorax or pleural effusion. BONY THORAX AND SOFT TISSUES: No acute or destructive osseous abnormality. _______________                 Medications given in the ED-  Medications - No data to display      Medical Decision Making     I reviewed the vital signs, available nursing notes, past medical history, past surgical history, family history and social history. Vital Signs interpretation- I have reviewed the patient's vital signs. Pulse Oximetry interpretation - 100% on Room air     Cardiac Monitor interpretation:  Rate: 82 bpm  Rhythm: sinus    Records Reviewed: Nursing Notes and Old Medical Records    Procedures:  Procedures    ED Course & MDM:   Possible covid vs other lung infection. Influenza negative, cxr no acute process    Discussed with patient possibility of Coronavirus infection and need to stay home for 14 days of symptom onset.  Recommend to continue with supportive care including prescribed medications, tylenol for fevers or any others as directed. Monitor your symptoms for worsening to include difficulty breathing and come back to the hospital if those symptoms persist. Wash your hands with soap and water or use alcohol based  if needed. Avoid touching face and avoid crowds. They understand and agree to plan. Diagnosis and Disposition     DISCHARGE NOTE:    Reji Prabhakar  results have been reviewed with him. He has been counseled regarding his diagnosis, treatment, and plan. He verbally conveys understanding and agreement of the signs, symptoms, diagnosis, treatment and prognosis and additionally agrees to follow up as discussed. He also agrees with the care-plan and conveys that all of his questions have been answered. I have also provided discharge instructions for him that include: educational information regarding their diagnosis and treatment, and list of reasons why they would want to return to the ED prior to their follow-up appointment, should his condition change. He has been provided with education for proper emergency department utilization. CLINICAL IMPRESSION:    1. Cough    2. Body aches    3. Suspected COVID-19 virus infection        PLAN:  1. D/C Home  2. Current Discharge Medication List      START taking these medications    Details   azithromycin (Zithromax Z-Clifton) 250 mg tablet Take 2 tabs on the first day then 1 tab daily for the remaining 4 days. Qty: 6 Tab, Refills: 0      methylPREDNISolone (Medrol, Clifton,) 4 mg tablet Take as directed  Qty: 1 Dose Pack, Refills: 0      !! albuterol (PROVENTIL HFA, VENTOLIN HFA, PROAIR HFA) 90 mcg/actuation inhaler Take 1 Puff by inhalation every four (4) hours as needed for Wheezing. Qty: 1 Inhaler, Refills: 0       !! - Potential duplicate medications found. Please discuss with provider.       CONTINUE these medications which have NOT CHANGED    Details   !! albuterol (PROVENTIL HFA, VENTOLIN HFA, PROAIR HFA) 90 mcg/actuation inhaler Take 2 Puffs by inhalation every four (4) hours as needed for Wheezing or Shortness of Breath. Qty: 1 Inhaler, Refills: 1       !! - Potential duplicate medications found. Please discuss with provider. 3.   Follow-up Information     Follow up With Specialties Details Why Contact Marcia Melissa, DO Internal Medicine Schedule an appointment as soon as possible for a visit   [address]      [facility] EMERGENCY DEPT Emergency Medicine Go to  If symptoms worsen [address] [address]  [phone]        _______________________________      Please note that this dictation was completed with Dopplr, the computer voice recognition software. Quite often unanticipated grammatical, syntax, homophones, and other interpretive errors are inadvertently transcribed by the computer software. Please disregard these errors. Please excuse any errors that have escaped final proofreading.

## 2020-12-03 ENCOUNTER — PATIENT OUTREACH (OUTPATIENT)
Dept: CASE MANAGEMENT | Age: 80
End: 2020-12-03

## 2020-12-03 NOTE — PROGRESS NOTES
Date/Time:  12/3/2020 11:36 AM   Call within 2 business days of discharge: Yes   Attempted to reach patient by telephone. Unable to leave HIPPA compliant message requesting a return call. Will attempt to reach patient again.      Present number is to a business

## 2020-12-04 ENCOUNTER — HOSPITAL ENCOUNTER (INPATIENT)
Age: 80
LOS: 6 days | Discharge: HOME | DRG: 177 | End: 2020-12-11
Attending: EMERGENCY MEDICINE | Admitting: FAMILY MEDICINE
Payer: MEDICARE

## 2020-12-04 DIAGNOSIS — R09.02 HYPOXEMIA: ICD-10-CM

## 2020-12-04 DIAGNOSIS — U07.1 COVID-19: Primary | ICD-10-CM

## 2020-12-04 LAB
ALBUMIN SERPL-MCNC: 3.1 G/DL (ref 3.4–5)
ALBUMIN/GLOB SERPL: 0.7 {RATIO} (ref 0.8–1.7)
ALP SERPL-CCNC: 77 U/L (ref 45–117)
ALT SERPL-CCNC: 10 U/L (ref 16–61)
ANION GAP SERPL CALC-SCNC: 10 MMOL/L (ref 3–18)
APTT PPP: 29.6 SEC (ref 23–36.4)
ARTERIAL PATENCY WRIST A: YES
AST SERPL-CCNC: 15 U/L (ref 10–38)
BASE DEFICIT BLD-SCNC: 2 MMOL/L
BASOPHILS # BLD: 0 K/UL (ref 0–0.1)
BASOPHILS NFR BLD: 0 % (ref 0–2)
BDY SITE: ABNORMAL
BILIRUB SERPL-MCNC: 0.4 MG/DL (ref 0.2–1)
BNP SERPL-MCNC: 121 PG/ML (ref 0–1800)
BODY TEMPERATURE: 99.8
BUN SERPL-MCNC: 16 MG/DL (ref 7–18)
BUN/CREAT SERPL: 15 (ref 12–20)
CALCIUM SERPL-MCNC: 8.2 MG/DL (ref 8.5–10.1)
CHLORIDE SERPL-SCNC: 103 MMOL/L (ref 100–111)
CK MB CFR SERPL CALC: NORMAL % (ref 0–4)
CK MB SERPL-MCNC: <1 NG/ML (ref 5–25)
CK SERPL-CCNC: 84 U/L (ref 39–308)
CO2 SERPL-SCNC: 24 MMOL/L (ref 21–32)
CREAT SERPL-MCNC: 1.07 MG/DL (ref 0.6–1.3)
D DIMER PPP FEU-MCNC: 1.15 UG/ML(FEU)
DIFFERENTIAL METHOD BLD: ABNORMAL
EOSINOPHIL # BLD: 0 K/UL (ref 0–0.4)
EOSINOPHIL NFR BLD: 0 % (ref 0–5)
ERYTHROCYTE [DISTWIDTH] IN BLOOD BY AUTOMATED COUNT: 14.6 % (ref 11.6–14.5)
GAS FLOW.O2 O2 DELIVERY SYS: ABNORMAL L/MIN
GLOBULIN SER CALC-MCNC: 4.2 G/DL (ref 2–4)
GLUCOSE SERPL-MCNC: 110 MG/DL (ref 74–99)
HCO3 BLD-SCNC: 22.4 MMOL/L (ref 22–26)
HCT VFR BLD AUTO: 42.5 % (ref 36–48)
HGB BLD-MCNC: 14.7 G/DL (ref 13–16)
INR PPP: 1.1 (ref 0.8–1.2)
LYMPHOCYTES # BLD: 0.8 K/UL (ref 0.9–3.6)
LYMPHOCYTES NFR BLD: 14 % (ref 21–52)
MCH RBC QN AUTO: 30.8 PG (ref 24–34)
MCHC RBC AUTO-ENTMCNC: 34.6 G/DL (ref 31–37)
MCV RBC AUTO: 88.9 FL (ref 74–97)
MONOCYTES # BLD: 0.6 K/UL (ref 0.05–1.2)
MONOCYTES NFR BLD: 10 % (ref 3–10)
NEUTS SEG # BLD: 4.3 K/UL (ref 1.8–8)
NEUTS SEG NFR BLD: 76 % (ref 40–73)
O2/TOTAL GAS SETTING VFR VENT: 21 %
PCO2 BLD: 36.4 MMHG (ref 35–45)
PH BLD: 7.4 [PH] (ref 7.35–7.45)
PLATELET # BLD AUTO: 102 K/UL (ref 135–420)
PMV BLD AUTO: 12.3 FL (ref 9.2–11.8)
PO2 BLD: 69 MMHG (ref 80–100)
POTASSIUM SERPL-SCNC: 3.7 MMOL/L (ref 3.5–5.5)
PROT SERPL-MCNC: 7.3 G/DL (ref 6.4–8.2)
PROTHROMBIN TIME: 13.9 SEC (ref 11.5–15.2)
RBC # BLD AUTO: 4.78 M/UL (ref 4.7–5.5)
SAO2 % BLD: 93 % (ref 92–97)
SARS-COV-2, COV2NT: DETECTED
SERVICE CMNT-IMP: ABNORMAL
SODIUM SERPL-SCNC: 137 MMOL/L (ref 136–145)
SOURCE, COVRS: ABNORMAL
SPECIMEN TYPE, XMCV1T: ABNORMAL
SPECIMEN TYPE: ABNORMAL
TOTAL RESP. RATE, ITRR: 30
TROPONIN I SERPL-MCNC: 0.02 NG/ML (ref 0–0.04)
WBC # BLD AUTO: 5.6 K/UL (ref 4.6–13.2)

## 2020-12-04 PROCEDURE — 96375 TX/PRO/DX INJ NEW DRUG ADDON: CPT

## 2020-12-04 PROCEDURE — 99285 EMERGENCY DEPT VISIT HI MDM: CPT

## 2020-12-04 PROCEDURE — 85379 FIBRIN DEGRADATION QUANT: CPT

## 2020-12-04 PROCEDURE — 83036 HEMOGLOBIN GLYCOSYLATED A1C: CPT

## 2020-12-04 PROCEDURE — 74011250636 HC RX REV CODE- 250/636: Performed by: EMERGENCY MEDICINE

## 2020-12-04 PROCEDURE — 82803 BLOOD GASES ANY COMBINATION: CPT

## 2020-12-04 PROCEDURE — 36600 WITHDRAWAL OF ARTERIAL BLOOD: CPT

## 2020-12-04 PROCEDURE — 80053 COMPREHEN METABOLIC PANEL: CPT

## 2020-12-04 PROCEDURE — 83880 ASSAY OF NATRIURETIC PEPTIDE: CPT

## 2020-12-04 PROCEDURE — 96374 THER/PROPH/DIAG INJ IV PUSH: CPT

## 2020-12-04 PROCEDURE — 85730 THROMBOPLASTIN TIME PARTIAL: CPT

## 2020-12-04 PROCEDURE — 93005 ELECTROCARDIOGRAM TRACING: CPT

## 2020-12-04 PROCEDURE — 85025 COMPLETE CBC W/AUTO DIFF WBC: CPT

## 2020-12-04 PROCEDURE — 85610 PROTHROMBIN TIME: CPT

## 2020-12-04 PROCEDURE — 82550 ASSAY OF CK (CPK): CPT

## 2020-12-04 RX ORDER — KETOROLAC TROMETHAMINE 15 MG/ML
15 INJECTION, SOLUTION INTRAMUSCULAR; INTRAVENOUS
Status: COMPLETED | OUTPATIENT
Start: 2020-12-04 | End: 2020-12-04

## 2020-12-04 RX ORDER — ONDANSETRON 2 MG/ML
4 INJECTION INTRAMUSCULAR; INTRAVENOUS
Status: COMPLETED | OUTPATIENT
Start: 2020-12-04 | End: 2020-12-04

## 2020-12-04 RX ORDER — DEXAMETHASONE SODIUM PHOSPHATE 4 MG/ML
10 INJECTION, SOLUTION INTRA-ARTICULAR; INTRALESIONAL; INTRAMUSCULAR; INTRAVENOUS; SOFT TISSUE ONCE
Status: COMPLETED | OUTPATIENT
Start: 2020-12-04 | End: 2020-12-04

## 2020-12-04 RX ADMIN — SODIUM CHLORIDE 1000 ML: 900 INJECTION, SOLUTION INTRAVENOUS at 23:20

## 2020-12-04 RX ADMIN — ONDANSETRON 4 MG: 2 INJECTION INTRAMUSCULAR; INTRAVENOUS at 23:21

## 2020-12-04 RX ADMIN — DEXAMETHASONE SODIUM PHOSPHATE 10 MG: 4 INJECTION, SOLUTION INTRAMUSCULAR; INTRAVENOUS at 00:30

## 2020-12-04 RX ADMIN — KETOROLAC TROMETHAMINE 15 MG: 15 INJECTION, SOLUTION INTRAMUSCULAR; INTRAVENOUS at 23:21

## 2020-12-04 NOTE — CALL BACK NOTE
Call primary number on file voicemail picked up for some sort of business mechanical service. Did not leave voicemail. Call was made to emergency contact patient's daughter. This number is \"out of service\". Will call primary number again leave voicemail requesting call back for results review. Will also send a certified letter requesting call back. Patient needs advised of positive Covid screening.

## 2020-12-05 ENCOUNTER — APPOINTMENT (OUTPATIENT)
Dept: GENERAL RADIOLOGY | Age: 80
DRG: 177 | End: 2020-12-05
Attending: EMERGENCY MEDICINE
Payer: MEDICARE

## 2020-12-05 PROBLEM — R09.02 HYPOXEMIA: Status: ACTIVE | Noted: 2020-12-05

## 2020-12-05 PROBLEM — U07.1 COVID-19: Status: ACTIVE | Noted: 2020-12-05

## 2020-12-05 PROBLEM — I10 HYPERTENSION: Status: ACTIVE | Noted: 2020-12-05

## 2020-12-05 LAB
25(OH)D3 SERPL-MCNC: 22.8 NG/ML (ref 30–100)
ABO + RH BLD: NORMAL
BLOOD GROUP ANTIBODIES SERPL: NORMAL
D DIMER PPP FEU-MCNC: 1.12 UG/ML(FEU)
FIBRINOGEN PPP-MCNC: 542 MG/DL (ref 210–451)
GLUCOSE BLD STRIP.AUTO-MCNC: 123 MG/DL (ref 70–110)
HBA1C MFR BLD: 5.5 % (ref 4.2–5.6)
LACTATE SERPL-SCNC: 0.7 MMOL/L (ref 0.4–2)
SPECIMEN EXP DATE BLD: NORMAL

## 2020-12-05 PROCEDURE — 82306 VITAMIN D 25 HYDROXY: CPT

## 2020-12-05 PROCEDURE — 85384 FIBRINOGEN ACTIVITY: CPT

## 2020-12-05 PROCEDURE — 86900 BLOOD TYPING SEROLOGIC ABO: CPT

## 2020-12-05 PROCEDURE — 74011250636 HC RX REV CODE- 250/636: Performed by: FAMILY MEDICINE

## 2020-12-05 PROCEDURE — 82962 GLUCOSE BLOOD TEST: CPT

## 2020-12-05 PROCEDURE — 65660000000 HC RM CCU STEPDOWN

## 2020-12-05 PROCEDURE — 83605 ASSAY OF LACTIC ACID: CPT

## 2020-12-05 PROCEDURE — 74011250637 HC RX REV CODE- 250/637: Performed by: FAMILY MEDICINE

## 2020-12-05 PROCEDURE — 71045 X-RAY EXAM CHEST 1 VIEW: CPT

## 2020-12-05 PROCEDURE — 85379 FIBRIN DEGRADATION QUANT: CPT

## 2020-12-05 RX ORDER — SODIUM CHLORIDE 9 MG/ML
250 INJECTION, SOLUTION INTRAVENOUS AS NEEDED
Status: DISCONTINUED | OUTPATIENT
Start: 2020-12-05 | End: 2020-12-11 | Stop reason: HOSPADM

## 2020-12-05 RX ORDER — ENOXAPARIN SODIUM 100 MG/ML
30 INJECTION SUBCUTANEOUS EVERY 12 HOURS
Status: DISCONTINUED | OUTPATIENT
Start: 2020-12-05 | End: 2020-12-05

## 2020-12-05 RX ORDER — FAMOTIDINE 20 MG/1
20 TABLET, FILM COATED ORAL 2 TIMES DAILY
Status: DISCONTINUED | OUTPATIENT
Start: 2020-12-05 | End: 2020-12-11 | Stop reason: HOSPADM

## 2020-12-05 RX ORDER — ACETAMINOPHEN 325 MG/1
650 TABLET ORAL
Status: DISCONTINUED | OUTPATIENT
Start: 2020-12-05 | End: 2020-12-11 | Stop reason: HOSPADM

## 2020-12-05 RX ORDER — SODIUM CHLORIDE 0.9 % (FLUSH) 0.9 %
5-40 SYRINGE (ML) INJECTION EVERY 8 HOURS
Status: DISCONTINUED | OUTPATIENT
Start: 2020-12-05 | End: 2020-12-11 | Stop reason: HOSPADM

## 2020-12-05 RX ORDER — ONDANSETRON 2 MG/ML
4 INJECTION INTRAMUSCULAR; INTRAVENOUS
Status: DISCONTINUED | OUTPATIENT
Start: 2020-12-05 | End: 2020-12-11 | Stop reason: HOSPADM

## 2020-12-05 RX ORDER — DEXAMETHASONE 4 MG/1
6 TABLET ORAL DAILY
Status: DISCONTINUED | OUTPATIENT
Start: 2020-12-06 | End: 2020-12-11 | Stop reason: HOSPADM

## 2020-12-05 RX ORDER — SODIUM CHLORIDE 0.9 % (FLUSH) 0.9 %
5-40 SYRINGE (ML) INJECTION AS NEEDED
Status: DISCONTINUED | OUTPATIENT
Start: 2020-12-05 | End: 2020-12-11 | Stop reason: HOSPADM

## 2020-12-05 RX ORDER — ZINC SULFATE 50(220)MG
1 CAPSULE ORAL EVERY 12 HOURS
Status: DISCONTINUED | OUTPATIENT
Start: 2020-12-05 | End: 2020-12-11 | Stop reason: HOSPADM

## 2020-12-05 RX ORDER — ASCORBIC ACID 250 MG
500 TABLET ORAL 2 TIMES DAILY
Status: DISCONTINUED | OUTPATIENT
Start: 2020-12-05 | End: 2020-12-11 | Stop reason: HOSPADM

## 2020-12-05 RX ORDER — MELATONIN
6000 DAILY
Status: DISCONTINUED | OUTPATIENT
Start: 2020-12-05 | End: 2020-12-11 | Stop reason: HOSPADM

## 2020-12-05 RX ORDER — CHOLECALCIFEROL (VITAMIN D3) 125 MCG
10 CAPSULE ORAL
Status: DISCONTINUED | OUTPATIENT
Start: 2020-12-05 | End: 2020-12-11 | Stop reason: HOSPADM

## 2020-12-05 RX ORDER — ENOXAPARIN SODIUM 100 MG/ML
40 INJECTION SUBCUTANEOUS EVERY 24 HOURS
Status: DISCONTINUED | OUTPATIENT
Start: 2020-12-05 | End: 2020-12-10

## 2020-12-05 RX ORDER — ACETAMINOPHEN 650 MG/1
650 SUPPOSITORY RECTAL
Status: DISCONTINUED | OUTPATIENT
Start: 2020-12-05 | End: 2020-12-11 | Stop reason: HOSPADM

## 2020-12-05 RX ORDER — POLYETHYLENE GLYCOL 3350 17 G/17G
17 POWDER, FOR SOLUTION ORAL DAILY PRN
Status: DISCONTINUED | OUTPATIENT
Start: 2020-12-05 | End: 2020-12-11 | Stop reason: HOSPADM

## 2020-12-05 RX ORDER — PROMETHAZINE HYDROCHLORIDE 25 MG/1
12.5 TABLET ORAL
Status: DISCONTINUED | OUTPATIENT
Start: 2020-12-05 | End: 2020-12-11 | Stop reason: HOSPADM

## 2020-12-05 RX ADMIN — Medication 500 MG: at 22:10

## 2020-12-05 RX ADMIN — FAMOTIDINE 20 MG: 20 TABLET, FILM COATED ORAL at 22:10

## 2020-12-05 RX ADMIN — FAMOTIDINE 20 MG: 20 TABLET, FILM COATED ORAL at 09:13

## 2020-12-05 RX ADMIN — Medication 1 CAPSULE: at 09:13

## 2020-12-05 RX ADMIN — Medication 1 CAPSULE: at 22:10

## 2020-12-05 RX ADMIN — Medication 10 MG: at 03:24

## 2020-12-05 RX ADMIN — ENOXAPARIN SODIUM 40 MG: 40 INJECTION SUBCUTANEOUS at 09:14

## 2020-12-05 RX ADMIN — Medication 500 MG: at 09:13

## 2020-12-05 RX ADMIN — Medication 10 MG: at 22:08

## 2020-12-05 RX ADMIN — Medication 6 TABLET: at 09:13

## 2020-12-05 RX ADMIN — Medication 10 ML: at 03:25

## 2020-12-05 NOTE — PROGRESS NOTES
Problem: Airway Clearance - Ineffective  Goal: Achieve or maintain patent airway  Outcome: Progressing Towards Goal     Problem: Gas Exchange - Impaired  Goal: Absence of hypoxia  Outcome: Progressing Towards Goal  Goal: Promote optimal lung function  Outcome: Progressing Towards Goal     Problem: Breathing Pattern - Ineffective  Goal: Ability to achieve and maintain a regular respiratory rate  Outcome: Progressing Towards Goal     Problem: Falls - Risk of  Goal: *Absence of Falls  Description: Document Carlene Fall Risk and appropriate interventions in the flowsheet.   Outcome: Progressing Towards Goal  Note: Fall Risk Interventions:            Medication Interventions: Patient to call before getting OOB, Teach patient to arise slowly                   Problem: Patient Education: Go to Patient Education Activity  Goal: Patient/Family Education  Outcome: Progressing Towards Goal

## 2020-12-05 NOTE — H&P
History & Physical    Patient: Low Gallego MRN: [id number]  [facility]: [id number]    YOB: 1940  Age: [de-identified] y.o. Sex: male      DOA: 12/4/2020  Primary Care Provider:  None      Assessment/Plan     Patient Active Problem List   Diagnosis Code    Cough R05    Body aches R52    Suspected COVID-19 virus infection Z20.828    Hypoxemia R09.02    COVID-19 U07.1    Hypertension I10     [de-identified] y/o healthy male with HTN admitted with mild hypoxemia due to COVID infection. His O2 saturation in the ED was 92% but was 96-97% at the time of my evaluation. I will admit him to monitor for clinical worsening due to borderline oxygenation, age, and the fact he lives alone. Hypoxemia due to COVID infection  -pulse oximetry  -nasal cannula as needed for O2 sat <94%  -decadron IV tonight and PO tomorrow  -pepcid PO   -antioxidant cocktail  -COVID labs  -agreeable to receive convalescent plasma if worsening (will start if consistently requiring oxygen)  -hold off on remdesivir for now unless clinical worsening    Full code    Prophylaxis: pepcid PO, low dose lovenox SQ    Estimated length of stay : 2 nights    MD Pete Ascenciournist  ----------------------------------------------------------------------------------------------------------------------------------------------------------------------------  CC: flu like symptoms, positive COVID test       HPI:     Low Gallego is a [de-identified] y.o. male who is healthy and only has HTN controlled by diet who presents with 4 days of flu like illness. He was seen in the ED 2 days ago and tested positive for COVID. He had Thanksgiving dinner with asymptomatic relatives a few days prior to this. He was given inhalers and steroids but started having nausea and vomiting tonight when he tried to go to sleep. He has had a mild dry cough and low grade fever. He denies chest pain or shortness of breath. He lives alone.     Past Medical History:   Diagnosis Date    Hypertension History reviewed. No pertinent surgical history. Family History   Problem Relation Age of Onset    No Known Problems Mother     No Known Problems Father        Social History     Socioeconomic History    Marital status:      Spouse name: Not on file    Number of children: Not on file    Years of education: Not on file    Highest education level: Not on file   Tobacco Use    Smoking status: Never Smoker    Smokeless tobacco: Never Used   Substance and Sexual Activity    Alcohol use: No   Other Topics Concern       Prior to Admission medications    Medication Sig Start Date End Date Taking? Authorizing Provider   azithromycin (Zithromax Z-Clifton) 250 mg tablet Take 2 tabs on the first day then 1 tab daily for the remaining 4 days. 12/2/20   Huffman Hipps, DO   methylPREDNISolone (Medrol, Clifton,) 4 mg tablet Take as directed 12/2/20   Teresa Verma A, DO   albuterol (PROVENTIL HFA, VENTOLIN HFA, PROAIR HFA) 90 mcg/actuation inhaler Take 1 Puff by inhalation every four (4) hours as needed for Wheezing. 12/2/20   Teresa Verma A, DO   DEXTROMETHORPHAN HBR (THERAFLU COUGH PO) Take  by mouth. Other, MD Dylon   diphenhydrAMINE (BENADRYL) 25 mg capsule Take 2 Caps by mouth nightly as needed. Take 1-2 tabs every 6 hours as needed. 3/1/18   Jose Avila MD   fluticasone (FLONASE) 50 mcg/actuation nasal spray 2 Sprays by Both Nostrils route daily. 3/1/18   Jose Avila MD   albuterol (PROVENTIL HFA, VENTOLIN HFA, PROAIR HFA) 90 mcg/actuation inhaler Take 2 Puffs by inhalation every four (4) hours as needed for Wheezing or Shortness of Breath. 1/25/17   Peterson Sherman PA-C   inhalational spacing device 1 Each by Does Not Apply route as needed. Please dispense one adult spacer to be used with albuterol HFA. 1/25/17   Denzel LACEY PA-C       No Known Allergies    Review of Systems  Gen: +fever, chills, malaise, no weight loss/gain. Heent: No headache, rhinorrhea, sore throat. Heart: No chest pain, palpitations, RICO, pnd, or orthopnea. Resp: +cough, no shortness of breath. GI: +nausea, vomiting, no diarrhea, constipation, melena or hematochezia. : No urinary obstruction, dysuria or hematuria. Derm: No rash, new skin lesion or pruritis. Musc/skeletal: no bone or joint complaints. Vasc: No edema, cyanosis or claudication. Endo: No heat/cold intolerance, no polyuria,polydipsia or polyphagia. Neuro: No unilateral weakness, numbness, tingling. No seizures. Heme: No easy bruising or bleeding. Physical Exam:     Physical Exam:  Visit Vitals  /79   Pulse 69   Temp 98.9 °F (37.2 °C)   Resp 23   Ht 5' 7\" (1.702 m)   Wt 81.6 kg (180 lb)   SpO2 93%   BMI 28.19 kg/m²      O2 Device: Room air    Temp (24hrs), Av.9 °F (37.7 °C), Min:98.9 °F (37.2 °C), Max:101.1 °F (38.4 °C)    No intake/output data recorded. No intake/output data recorded. General:  Awake, cooperative, no distress. Head:  Normocephalic, without obvious abnormality, atraumatic. Eyes:  Conjunctivae/corneas clear, sclera anicteric. Neck: Supple, symmetrical, trachea midline. Lungs:   Coarse breath sounds in LLL. Heart:  Regular rate and rhythm, S1, S2 normal, no murmur, click, rub or gallop. Abdomen: Soft, non-tender. Bowel sounds normal. No masses,  No organomegaly. Extremities: Extremities normal, atraumatic, no cyanosis or edema. Capillary refill normal.   Pulses: 2+ and symmetric all extremities. Skin: Skin color pink, turgor normal. No rashes or lesions   Neurologic: No focal motor or sensory deficit. Labs Reviewed: All lab results for the last 24 hours reviewed.   Recent Results (from the past 24 hour(s))   EKG, 12 LEAD, INITIAL    Collection Time: 20 11:06 PM   Result Value Ref Range    Ventricular Rate 85 BPM    Atrial Rate 85 BPM    P-R Interval 164 ms    QRS Duration 140 ms    Q-T Interval 380 ms    QTC Calculation (Bezet) 452 ms    Calculated P Axis 49 degrees    Calculated R Axis -67 degrees    Calculated T Axis 1 degrees    Diagnosis       Normal sinus rhythm  Possible Left atrial enlargement  Right bundle branch block  Left anterior fascicular block  Bifascicular block  Left ventricular hypertrophy  Abnormal ECG  When compared with ECG of 25-JAN-2017 10:31,  No significant change was found     CBC WITH AUTOMATED DIFF    Collection Time: 12/04/20 11:15 PM   Result Value Ref Range    WBC 5.6 4.6 - 13.2 K/uL    RBC 4.78 4.70 - 5.50 M/uL    HGB 14.7 13.0 - 16.0 g/dL    HCT 42.5 36.0 - 48.0 %    MCV 88.9 74.0 - 97.0 FL    MCH 30.8 24.0 - 34.0 PG    MCHC 34.6 31.0 - 37.0 g/dL    RDW 14.6 (H) 11.6 - 14.5 %    PLATELET 109 (L) 095 - 420 K/uL    MPV 12.3 (H) 9.2 - 11.8 FL    NEUTROPHILS 76 (H) 40 - 73 %    LYMPHOCYTES 14 (L) 21 - 52 %    MONOCYTES 10 3 - 10 %    EOSINOPHILS 0 0 - 5 %    BASOPHILS 0 0 - 2 %    ABS. NEUTROPHILS 4.3 1.8 - 8.0 K/UL    ABS. LYMPHOCYTES 0.8 (L) 0.9 - 3.6 K/UL    ABS. MONOCYTES 0.6 0.05 - 1.2 K/UL    ABS. EOSINOPHILS 0.0 0.0 - 0.4 K/UL    ABS. BASOPHILS 0.0 0.0 - 0.1 K/UL    DF AUTOMATED     METABOLIC PANEL, COMPREHENSIVE    Collection Time: 12/04/20 11:15 PM   Result Value Ref Range    Sodium 137 136 - 145 mmol/L    Potassium 3.7 3.5 - 5.5 mmol/L    Chloride 103 100 - 111 mmol/L    CO2 24 21 - 32 mmol/L    Anion gap 10 3.0 - 18 mmol/L    Glucose 110 (H) 74 - 99 mg/dL    BUN 16 7.0 - 18 MG/DL    Creatinine 1.07 0.6 - 1.3 MG/DL    BUN/Creatinine ratio 15 12 - 20      GFR est AA >60 >60 ml/min/1.73m2    GFR est non-AA >60 >60 ml/min/1.73m2    Calcium 8.2 (L) 8.5 - 10.1 MG/DL    Bilirubin, total 0.4 0.2 - 1.0 MG/DL    ALT (SGPT) 10 (L) 16 - 61 U/L    AST (SGOT) 15 10 - 38 U/L    Alk.  phosphatase 77 45 - 117 U/L    Protein, total 7.3 6.4 - 8.2 g/dL    Albumin 3.1 (L) 3.4 - 5.0 g/dL    Globulin 4.2 (H) 2.0 - 4.0 g/dL    A-G Ratio 0.7 (L) 0.8 - 1.7     NT-PRO BNP    Collection Time: 12/04/20 11:15 PM   Result Value Ref Range    NT pro- 0 - 1,800 PG/ML   D DIMER    Collection Time: 12/04/20 11:15 PM   Result Value Ref Range    D DIMER 1.15 (H) <0.46 ug/ml(FEU)   PROTHROMBIN TIME + INR    Collection Time: 12/04/20 11:15 PM   Result Value Ref Range    Prothrombin time 13.9 11.5 - 15.2 sec    INR 1.1 0.8 - 1.2     PTT    Collection Time: 12/04/20 11:15 PM   Result Value Ref Range    aPTT 29.6 23.0 - 36.4 SEC   CARDIAC PANEL,(CK, CKMB & TROPONIN)    Collection Time: 12/04/20 11:15 PM   Result Value Ref Range    CK - MB <1.0 <3.6 ng/ml    CK-MB Index  0.0 - 4.0 %     CALCULATION NOT PERFORMED WHEN RESULT IS BELOW LINEAR LIMIT    CK 84 39 - 308 U/L    Troponin-I, QT 0.02 0.0 - 0.045 NG/ML   POC G3    Collection Time: 12/04/20 11:42 PM   Result Value Ref Range    Device: ROOM AIR      FIO2 (POC) 21 %    pH (POC) 7.40 7.35 - 7.45      pCO2 (POC) 36.4 35.0 - 45.0 MMHG    pO2 (POC) 69 (L) 80 - 100 MMHG    HCO3 (POC) 22.4 22 - 26 MMOL/L    sO2 (POC) 93 92 - 97 %    Base deficit (POC) 2 mmol/L    Allens test (POC) YES      Total resp. rate 30      Site RIGHT RADIAL      Patient temp. 99.8      Specimen type (POC) ARTERIAL      Performed by Donovan Stable      Results   XR CHEST PA LAT (Accession [id number]) (Order [id number])   Allergies      No Known Allergies    Exam Information     Status  Exam Begun   Exam Ended     Final [99]  12/02/2020  13:30  12/02/2020  13:39    Result Information     Status: Final result (Exam End: 12/2/2020 13:39)  Provider Status: Open    Study Result     EXAM: CHEST X-RAY       Technique:  Frontal and Lateral views of the chest.     History: Cough     Comparison: 1/25/2017, 1/4/2015     _______________     FINDINGS:     HEART AND MEDIASTINUM: Midline cardiac silhouette, normal in size. Unremarkable  hilar vascular structures.     LUNGS AND PLEURAL SPACES: Stable appearing bilateral vertically oriented  peripheral mid lung opacities, potentially chronic pleural scarring and/or  thickening.  No developing focal consolidation, parenchymal opacity. No  pneumothorax or pleural effusion.     BONY THORAX AND SOFT TISSUES: No acute or destructive osseous abnormality. _______________     IMPRESSION  IMPRESSION:     1. Stable exam. No acute cardiopulmonary process.

## 2020-12-05 NOTE — ED NOTES
Verbal shift change report given to Robert Bustillos (oncoming nurse) by Alyssia Orta RN (offgoing nurse). Report included the following information SBAR, Kardex, ED Summary, [facility] and Recent Results.

## 2020-12-05 NOTE — ED NOTES
Spoke with nursing supervisor. Was requested to have other admit go to floor first then reattempt to call report.

## 2020-12-05 NOTE — PROGRESS NOTES
0153: Received verbal report from Selin Perez, [facility].     2585: Admission assessment completed. Required Documentation completed. Patient is alert and oriented x4. Patient is on room air. Up ad will. Skin is intact. Lives alone, permits information to be shared with his daughter Marilynn Castillo. Patient states, \"I feel fine -- better than I have been. \"     0330: Plaletlets are 102. Will clarify Lovenox order. 4996: Bedside shift change report given to Natalia Osei RN (oncoming nurse) by Elsi Lopez (offgoing nurse). Report included the following information SBAR, Kardex and MAR. Shift summary: Patient had uneventful shift.

## 2020-12-05 NOTE — PROGRESS NOTES
0730:Received verbal bedside report from off going nurse VIVIEN Salinas R.N. Patient care received. Patient alert and oriented x 4. Patient resting in bed denies pain. Patient stable. Call light with in reach bed in lowest position.

## 2020-12-05 NOTE — ED TRIAGE NOTES
Pt c/oshortness of breath, chest congestion, nausea and vomiting.  Pt seen 2 days ago and has a positive covid test

## 2020-12-05 NOTE — ED PROVIDER NOTES
EMERGENCY DEPARTMENT HISTORY AND PHYSICAL EXAM    Date: 12/4/2020  Patient Name: Murali Hope    History of Presenting Illness     Chief Complaint   Patient presents with    Shortness of Breath    Vomiting         History Provided By: Patient    Murali Hope is a [de-identified] y.o. male who presents to the emergency department C/O generalized sensation of illness. Patient states he has cough, body aches, shortness of breath, nausea and vomiting. States his symptoms been going on for several days. States he was seen here 2 days ago but notes that the symptoms were not this bad. States he is still having fever and chills. States his cough is dry. States he did take the medications he was prescribed, antibiotics and steroids but ended up upsetting his stomach causing him to vomit.   No other complaints    PCP: None    Current Facility-Administered Medications   Medication Dose Route Frequency Provider Last Rate Last Dose    sodium chloride (NS) flush 5-40 mL  5-40 mL IntraVENous Q8H Kojo Addison MD        sodium chloride (NS) flush 5-40 mL  5-40 mL IntraVENous PRN Kojo Addison MD        acetaminophen (TYLENOL) tablet 650 mg  650 mg Oral Q6H PRN Kojo Addison MD        Or    acetaminophen (TYLENOL) suppository 650 mg  650 mg Rectal Q6H PRN Kojo Addison MD        polyethylene glycol (MIRALAX) packet 17 g  17 g Oral DAILY PRN Kojo Addison MD        promethazine (PHENERGAN) tablet 12.5 mg  12.5 mg Oral Q6H PRN Kojo Addison MD        Or    ondansetron [facility]) injection 4 mg  4 mg IntraVENous Q6H PRN Kojo Addison MD        famotidine (PEPCID) tablet 20 mg  20 mg Oral BID Kojo Addison MD        enoxaparin (LOVENOX) injection 30 mg  30 mg SubCUTAneous Q12H MD Marilou Mansfield ON 12/6/2020] dexAMETHasone (DECADRON) tablet 6 mg  6 mg Oral DAILY Kojo Addison MD        cholecalciferol (VITAMIN D3) (1000 Units /25 mcg) tablet 6 Tab  6,000 Units Oral DAILY Elanapeace Farrell MD        ascorbic acid (vitamin C) (VITAMIN C) tablet 500 mg  500 mg Oral BID Elana Farrell MD        zinc sulfate (ZINCATE) 220 (50) mg capsule 1 Cap  1 Cap Oral Q12H Elana Farrell MD        melatonin tablet 10 mg  10 mg Oral QHS Elanapeace Farrell MD        0.9% sodium chloride infusion 250 mL  250 mL IntraVENous PRN Elanapecae Farrell MD         Current Outpatient Medications   Medication Sig Dispense Refill    azithromycin (Zithromax Z-Clifton) 250 mg tablet Take 2 tabs on the first day then 1 tab daily for the remaining 4 days. 6 Tab 0    methylPREDNISolone (Medrol, Clifton,) 4 mg tablet Take as directed 1 Dose Pack 0    albuterol (PROVENTIL HFA, VENTOLIN HFA, PROAIR HFA) 90 mcg/actuation inhaler Take 1 Puff by inhalation every four (4) hours as needed for Wheezing. 1 Inhaler 0    DEXTROMETHORPHAN HBR (THERAFLU COUGH PO) Take  by mouth.  diphenhydrAMINE (BENADRYL) 25 mg capsule Take 2 Caps by mouth nightly as needed. Take 1-2 tabs every 6 hours as needed. 40 Cap 1    fluticasone (FLONASE) 50 mcg/actuation nasal spray 2 Sprays by Both Nostrils route daily. 1 Bottle 0    albuterol (PROVENTIL HFA, VENTOLIN HFA, PROAIR HFA) 90 mcg/actuation inhaler Take 2 Puffs by inhalation every four (4) hours as needed for Wheezing or Shortness of Breath. 1 Inhaler 1    inhalational spacing device 1 Each by Does Not Apply route as needed. Please dispense one adult spacer to be used with albuterol HFA. 1 Device 0       Past History     Past Medical History:  Past Medical History:   Diagnosis Date    Hypertension        Past Surgical History:  No past surgical history on file. Family History:  No family history on file.     Social History:  Social History     Tobacco Use    Smoking status: Never Smoker    Smokeless tobacco: Never Used   Substance Use Topics    Alcohol use: No    Drug use: Not on file       Allergies:  No Known Allergies      Review of Systems   Review of Systems   Constitutional: Positive for fever. Respiratory: Positive for cough and shortness of breath. Cardiovascular: Negative for chest pain. Gastrointestinal: Positive for nausea and vomiting. Negative for abdominal pain. Neurological: Positive for headaches. All other systems reviewed and are negative. All other systems reviewed and are negative.     Physical Exam     Vitals:    12/04/20 2345 12/05/20 0000 12/05/20 0030 12/05/20 0033   BP: (!) 140/62 135/65 127/61    Pulse: 72 70  72   Resp: 25 (!) 31  20   Temp:    98.9 °F (37.2 °C)   SpO2: 94% 94%  96%   Weight:       Height:         Physical Exam    Nursing notes and vital signs reviewed    Airway: intact, speaking normally  Breathing: No apparent distress, no cyanosis  Circulation: Peripheral pulses equal    Constitutional: fatigued appearing, Non toxic appearing, no acute distress, appearing stated age  HEENT:  Head: Normocephalic, Atraumatic  Eyes: PERRL, EOMI, No conjunctival injection  Ears: external ears normal  Nose: No rhinorrhea, external nose normal  Throat: mucous membranes moist  Neck: symmetric, trachea midline, no obvious swelling, no JVD  Cardiovascular: Regular rate and rhythm, no murmurs  Lungs: Clear to ausculation bilaterally, No stridor, Normal work of breathing and chest excursion bilaterally  Abdomen: Soft, non tender, non distended, normoactive bowel sounds, No rigidity, no peritoneal signs  Musculoskeletal:  No evidence of obvious deformity to the back, neck or extremities, no LE edema  Skin: Warm, dry, No obvious rashes  Neuro: Alert and oriented x 3, CN 2-12 intact, normal speech, strength and sensation full and symmetric bilaterally  Psychiatric: Normal mood and affect      Diagnostic Study Results     Labs -     Recent Results (from the past 72 hour(s))   INFLUENZA A & B AG (RAPID TEST)    Collection Time: 12/02/20  1:15 PM   Result Value Ref Range    Influenza A Antigen Negative NEG      Influenza B Antigen Negative NEG     SARS-COV-2    Collection Time: 12/02/20  1:15 PM   Result Value Ref Range    SARS-CoV-2 Detected (A) Not Detected      Specimen source Nasopharyngeal      Specimen type NP Swab     EKG, 12 LEAD, INITIAL    Collection Time: 12/04/20 11:06 PM   Result Value Ref Range    Ventricular Rate 85 BPM    Atrial Rate 85 BPM    P-R Interval 164 ms    QRS Duration 140 ms    Q-T Interval 380 ms    QTC Calculation (Bezet) 452 ms    Calculated P Axis 49 degrees    Calculated R Axis -67 degrees    Calculated T Axis 1 degrees    Diagnosis       Normal sinus rhythm  Possible Left atrial enlargement  Right bundle branch block  Left anterior fascicular block  Bifascicular block  Left ventricular hypertrophy  Abnormal ECG  When compared with ECG of 25-JAN-2017 10:31,  No significant change was found     CBC WITH AUTOMATED DIFF    Collection Time: 12/04/20 11:15 PM   Result Value Ref Range    WBC 5.6 4.6 - 13.2 K/uL    RBC 4.78 4.70 - 5.50 M/uL    HGB 14.7 13.0 - 16.0 g/dL    HCT 42.5 36.0 - 48.0 %    MCV 88.9 74.0 - 97.0 FL    MCH 30.8 24.0 - 34.0 PG    MCHC 34.6 31.0 - 37.0 g/dL    RDW 14.6 (H) 11.6 - 14.5 %    PLATELET 931 (L) 686 - 420 K/uL    MPV 12.3 (H) 9.2 - 11.8 FL    NEUTROPHILS 76 (H) 40 - 73 %    LYMPHOCYTES 14 (L) 21 - 52 %    MONOCYTES 10 3 - 10 %    EOSINOPHILS 0 0 - 5 %    BASOPHILS 0 0 - 2 %    ABS. NEUTROPHILS 4.3 1.8 - 8.0 K/UL    ABS. LYMPHOCYTES 0.8 (L) 0.9 - 3.6 K/UL    ABS. MONOCYTES 0.6 0.05 - 1.2 K/UL    ABS. EOSINOPHILS 0.0 0.0 - 0.4 K/UL    ABS.  BASOPHILS 0.0 0.0 - 0.1 K/UL    DF AUTOMATED     METABOLIC PANEL, COMPREHENSIVE    Collection Time: 12/04/20 11:15 PM   Result Value Ref Range    Sodium 137 136 - 145 mmol/L    Potassium 3.7 3.5 - 5.5 mmol/L    Chloride 103 100 - 111 mmol/L    CO2 24 21 - 32 mmol/L    Anion gap 10 3.0 - 18 mmol/L    Glucose 110 (H) 74 - 99 mg/dL    BUN 16 7.0 - 18 MG/DL    Creatinine 1.07 0.6 - 1.3 MG/DL    BUN/Creatinine ratio 15 12 - 20      GFR est AA >60 >60 ml/min/1.73m2    GFR est non-AA >60 >60 ml/min/1.73m2    Calcium 8.2 (L) 8.5 - 10.1 MG/DL    Bilirubin, total 0.4 0.2 - 1.0 MG/DL    ALT (SGPT) 10 (L) 16 - 61 U/L    AST (SGOT) 15 10 - 38 U/L    Alk. phosphatase 77 45 - 117 U/L    Protein, total 7.3 6.4 - 8.2 g/dL    Albumin 3.1 (L) 3.4 - 5.0 g/dL    Globulin 4.2 (H) 2.0 - 4.0 g/dL    A-G Ratio 0.7 (L) 0.8 - 1.7     NT-PRO BNP    Collection Time: 12/04/20 11:15 PM   Result Value Ref Range    NT pro- 0 - 1,800 PG/ML   D DIMER    Collection Time: 12/04/20 11:15 PM   Result Value Ref Range    D DIMER 1.15 (H) <0.46 ug/ml(FEU)   PROTHROMBIN TIME + INR    Collection Time: 12/04/20 11:15 PM   Result Value Ref Range    Prothrombin time 13.9 11.5 - 15.2 sec    INR 1.1 0.8 - 1.2     PTT    Collection Time: 12/04/20 11:15 PM   Result Value Ref Range    aPTT 29.6 23.0 - 36.4 SEC   CARDIAC PANEL,(CK, CKMB & TROPONIN)    Collection Time: 12/04/20 11:15 PM   Result Value Ref Range    CK - MB <1.0 <3.6 ng/ml    CK-MB Index  0.0 - 4.0 %     CALCULATION NOT PERFORMED WHEN RESULT IS BELOW LINEAR LIMIT    CK 84 39 - 308 U/L    Troponin-I, QT 0.02 0.0 - 0.045 NG/ML   POC G3    Collection Time: 12/04/20 11:42 PM   Result Value Ref Range    Device: ROOM AIR      FIO2 (POC) 21 %    pH (POC) 7.40 7.35 - 7.45      pCO2 (POC) 36.4 35.0 - 45.0 MMHG    pO2 (POC) 69 (L) 80 - 100 MMHG    HCO3 (POC) 22.4 22 - 26 MMOL/L    sO2 (POC) 93 92 - 97 %    Base deficit (POC) 2 mmol/L    Allens test (POC) YES      Total resp. rate 30      Site RIGHT RADIAL      Patient temp.  99.8      Specimen type (POC) ARTERIAL      Performed by Linda Yee        Radiologic Studies -   XR CHEST PORT    (Results Pending)     CT Results  (Last 48 hours)    None        CXR Results  (Last 48 hours)    None          Medications given in the ED-  Medications   sodium chloride (NS) flush 5-40 mL (has no administration in time range)   sodium chloride (NS) flush 5-40 mL (has no administration in time range)   acetaminophen (TYLENOL) tablet 650 mg (has no administration in time range)     Or   acetaminophen (TYLENOL) suppository 650 mg (has no administration in time range)   polyethylene glycol (MIRALAX) packet 17 g (has no administration in time range)   promethazine (PHENERGAN) tablet 12.5 mg (has no administration in time range)     Or   ondansetron (ZOFRAN) injection 4 mg (has no administration in time range)   famotidine (PEPCID) tablet 20 mg (has no administration in time range)   enoxaparin (LOVENOX) injection 30 mg (has no administration in time range)   dexAMETHasone (DECADRON) tablet 6 mg (has no administration in time range)   cholecalciferol (VITAMIN D3) (1000 Units /25 mcg) tablet 6 Tab (has no administration in time range)   ascorbic acid (vitamin C) (VITAMIN C) tablet 500 mg (has no administration in time range)   zinc sulfate (ZINCATE) 220 (50) mg capsule 1 Cap (has no administration in time range)   melatonin tablet 10 mg (has no administration in time range)   0.9% sodium chloride infusion 250 mL (has no administration in time range)   ondansetron (ZOFRAN) injection 4 mg (4 mg IntraVENous Given 12/4/20 2321)   ketorolac (TORADOL) injection 15 mg (15 mg IntraVENous Given 12/4/20 2321)   sodium chloride 0.9 % bolus infusion 1,000 mL (1,000 mL IntraVENous New Bag 12/4/20 2320)   dexamethasone (DECADRON) 4 mg/mL injection 10 mg (10 mg IntraVENous Given 12/4/20 0030)         Medical Decision Making     I reviewed the vital signs, available nursing notes, past medical history, past surgical history, family history and social history. Vital Signs interpretation- I have reviewed the patient's vital signs.     Pulse Oximetry interpretation - 92% on Room air     Cardiac Monitor interpretation:  Rate: 94 bpm  Rhythm: sinus    EKG interpretation: (Preliminary)  EKG interpretation by Dr. Schroeder Ke 85 normal sinus rhythm, right bundle branch block, left axis deviation, nonspecific ST changes     Records Reviewed: Nursing Notes and Old Medical Records    Procedures:  Procedures    ED Course & MDM:   Risk stratification: Patient's coronavirus swab did come back positive. Considering the patient's age and history of hypertension he is high risk for developing worsening of his condition. At this time his pulse ox is 92%. Discussion was made with the patient regarding potential treatment with [employer].  Patient is agreeable with that treatment if necessary. At this time will obtain blood work including ABG and give Toradol for his body aches and fever, Zofran and IV fluids    Data review: Laboratory findings showing normal cardiac tests, his D-dimer is slightly elevated. His ABG does show evidence of hypoxemia with a PO2 of 69. Patient was given IV Decadron. Problem follow up:  Discussed with the patient the results of his laboratory findings and imaging studies. At this point considering his hypoxemia I did recommend the patient be admitted to the hospital for supplemental oxygen and treatment of his COVID-19 infection. He understands and is agreeable to plan    Diagnosis and Disposition     Critical Care Time: 12:41 AM  I have spent 30 minutes of critical care time involved in lab review, consultations with specialist, family decision-making, and documentation. During this entire length of time I was immediately available to the patient. Critical Care: The reason for providing this level of medical care for this critically ill patient was due a critical illness that impaired one or more vital organ systems such that there was a high probability of imminent or life threatening deterioration in the patients condition. This care involved high complexity decision making to assess, manipulate, and support vital system functions, to treat this degreee vital organ system failure and to prevent further life threatening deterioration of the patients condition.     Core Measures:  For Hospitalized Patients:    1. Hospitalization Decision Time:  The decision to hospitalize the patient was made by Risa Damon DO at 12:41 AM on 12/4/2020    2. Aspirin: Aspirin was not given because the patient did not present with a stroke at the time of their Emergency Department evaluation    12:41 AM  Patient is being admitted to the hospital by Dr. Sanaz Claire. The results of their tests and reasons for their admission have been discussed with them and/or available family. They convey agreement and understanding for the need to be admitted and for their admission diagnosis. CONDITIONS ON ADMISSION:  Sepsis is not present at the time of admission. Deep Vein Thrombosis is not present at the time of admission. Thrombosis is not present at the time of admission. Urinary Tract Infection is not present at the time of admission. Covid Pneumonia is present at the time of admission. MRSA is not present at the time of admission. Wound infection is not present at the time of admission. Pressure Ulcer is not present at the time of admission. CLINICAL IMPRESSION:    1. COVID-19    2. Hypoxemia          Please note that this dictation was completed with [employer], the [employer] voice recognition software. Quite often unanticipated grammatical, syntax, homophones, and other interpretive errors are inadvertently transcribed by the computer software. Please disregard these errors. Please excuse any errors that have escaped final proofreading.

## 2020-12-06 LAB
ALBUMIN SERPL-MCNC: 2.7 G/DL (ref 3.4–5)
ALBUMIN/GLOB SERPL: 0.7 {RATIO} (ref 0.8–1.7)
ALP SERPL-CCNC: 69 U/L (ref 45–117)
ALT SERPL-CCNC: 8 U/L (ref 16–61)
ANION GAP SERPL CALC-SCNC: 6 MMOL/L (ref 3–18)
AST SERPL-CCNC: 14 U/L (ref 10–38)
ATRIAL RATE: 85 BPM
BILIRUB SERPL-MCNC: 0.3 MG/DL (ref 0.2–1)
BUN SERPL-MCNC: 23 MG/DL (ref 7–18)
BUN/CREAT SERPL: 23 (ref 12–20)
CALCIUM SERPL-MCNC: 8.4 MG/DL (ref 8.5–10.1)
CALCULATED P AXIS, ECG09: 49 DEGREES
CALCULATED R AXIS, ECG10: -67 DEGREES
CALCULATED T AXIS, ECG11: 1 DEGREES
CHLORIDE SERPL-SCNC: 106 MMOL/L (ref 100–111)
CO2 SERPL-SCNC: 28 MMOL/L (ref 21–32)
CREAT SERPL-MCNC: 0.98 MG/DL (ref 0.6–1.3)
D DIMER PPP FEU-MCNC: 0.95 UG/ML(FEU)
DIAGNOSIS, 93000: NORMAL
ERYTHROCYTE [DISTWIDTH] IN BLOOD BY AUTOMATED COUNT: 14.7 % (ref 11.6–14.5)
FIBRINOGEN PPP-MCNC: 513 MG/DL (ref 210–451)
GLOBULIN SER CALC-MCNC: 3.9 G/DL (ref 2–4)
GLUCOSE BLD STRIP.AUTO-MCNC: 106 MG/DL (ref 70–110)
GLUCOSE BLD STRIP.AUTO-MCNC: 134 MG/DL (ref 70–110)
GLUCOSE SERPL-MCNC: 87 MG/DL (ref 74–99)
HCT VFR BLD AUTO: 41.4 % (ref 36–48)
HGB BLD-MCNC: 14.5 G/DL (ref 13–16)
MCH RBC QN AUTO: 31.3 PG (ref 24–34)
MCHC RBC AUTO-ENTMCNC: 35 G/DL (ref 31–37)
MCV RBC AUTO: 89.2 FL (ref 74–97)
P-R INTERVAL, ECG05: 164 MS
PLATELET # BLD AUTO: 46 K/UL (ref 135–420)
PMV BLD AUTO: 11.9 FL (ref 9.2–11.8)
POTASSIUM SERPL-SCNC: 4.1 MMOL/L (ref 3.5–5.5)
PROT SERPL-MCNC: 6.6 G/DL (ref 6.4–8.2)
Q-T INTERVAL, ECG07: 380 MS
QRS DURATION, ECG06: 140 MS
QTC CALCULATION (BEZET), ECG08: 452 MS
RBC # BLD AUTO: 4.64 M/UL (ref 4.7–5.5)
SODIUM SERPL-SCNC: 140 MMOL/L (ref 136–145)
VENTRICULAR RATE, ECG03: 85 BPM
WBC # BLD AUTO: 9 K/UL (ref 4.6–13.2)

## 2020-12-06 PROCEDURE — 65660000000 HC RM CCU STEPDOWN

## 2020-12-06 PROCEDURE — 85379 FIBRIN DEGRADATION QUANT: CPT

## 2020-12-06 PROCEDURE — 85027 COMPLETE CBC AUTOMATED: CPT

## 2020-12-06 PROCEDURE — 82962 GLUCOSE BLOOD TEST: CPT

## 2020-12-06 PROCEDURE — 80053 COMPREHEN METABOLIC PANEL: CPT

## 2020-12-06 PROCEDURE — 74011250637 HC RX REV CODE- 250/637: Performed by: FAMILY MEDICINE

## 2020-12-06 PROCEDURE — 74011250636 HC RX REV CODE- 250/636: Performed by: FAMILY MEDICINE

## 2020-12-06 PROCEDURE — 85384 FIBRINOGEN ACTIVITY: CPT

## 2020-12-06 RX ORDER — ENOXAPARIN SODIUM 100 MG/ML
40 INJECTION SUBCUTANEOUS EVERY 24 HOURS
Status: CANCELLED | OUTPATIENT
Start: 2020-12-07

## 2020-12-06 RX ADMIN — Medication 500 MG: at 22:40

## 2020-12-06 RX ADMIN — DEXAMETHASONE 6 MG: 4 TABLET ORAL at 08:23

## 2020-12-06 RX ADMIN — FAMOTIDINE 20 MG: 20 TABLET, FILM COATED ORAL at 08:26

## 2020-12-06 RX ADMIN — Medication 10 MG: at 22:40

## 2020-12-06 RX ADMIN — POLYETHYLENE GLYCOL 3350 17 G: 17 POWDER, FOR SOLUTION ORAL at 11:52

## 2020-12-06 RX ADMIN — ACETAMINOPHEN 650 MG: 325 TABLET ORAL at 11:52

## 2020-12-06 RX ADMIN — Medication 6 TABLET: at 08:26

## 2020-12-06 RX ADMIN — Medication 1 CAPSULE: at 08:26

## 2020-12-06 RX ADMIN — Medication 10 ML: at 16:02

## 2020-12-06 RX ADMIN — Medication 10 ML: at 22:40

## 2020-12-06 RX ADMIN — FAMOTIDINE 20 MG: 20 TABLET, FILM COATED ORAL at 22:40

## 2020-12-06 RX ADMIN — Medication 1 CAPSULE: at 22:40

## 2020-12-06 RX ADMIN — Medication 500 MG: at 08:26

## 2020-12-06 NOTE — PROGRESS NOTES
Problem: Falls - Risk of  Goal: *Absence of Falls  Description: Document Ajitbobbi Carballo Fall Risk and appropriate interventions in the flowsheet. Outcome: Progressing Towards Goal  Note: Fall Risk Interventions:            Medication Interventions: Patient to call before getting OOB, Teach patient to arise slowly                   Problem: Patient Education: Go to Patient Education Activity  Goal: Patient/Family Education  Outcome: Progressing Towards Goal     Problem: Falls - Risk of  Goal: *Absence of Falls  Description: Document Kaylanelson Kait Fall Risk and appropriate interventions in the flowsheet.   Outcome: Progressing Towards Goal  Note: Fall Risk Interventions:            Medication Interventions: Patient to call before getting OOB, Teach patient to arise slowly                   Problem: Patient Education: Go to Patient Education Activity  Goal: Patient/Family Education  Outcome: Progressing Towards Goal

## 2020-12-06 NOTE — PROGRESS NOTES
Chart reviewed as CM on call. Pt admitted to tele for covid +. Provider please consider PT/OT eval orders if needed for safe dc planning. Pt lives alone. CM will follow for transition of care needs and will be available via hospital  on weekends. Reason for Admission:   Per H&P pt is \"is a [de-identified] y.o. male who is healthy and only has HTN controlled by diet who presents with 4 days of flu like illness. He was seen in the ED 2 days ago and tested positive for COVID. He had Thanksgiving dinner with asymptomatic relatives a few days prior to this. He was given inhalers and steroids but started having nausea and vomiting tonight when he tried to go to sleep. He has had a mild dry cough and low grade fever. He denies chest pain or shortness of breath. He lives alone. \"                   RUR Score:   12%                  Plan for utilizing home health:  TBD        PCP: First and Last name:  None listed   Name of Practice:    Are you a current patient: Yes/No:    Approximate date of last visit:    Can you participate in a virtual visit with your PCP:                     Current Advanced Directive/Advance Care Plan: not listed, will place spiritual care consult                         Transition of Care Plan:    TBD                    Care Management Interventions  Transition of Care Consult (CM Consult): Discharge Planning  Physical Therapy Consult: No  Occupational Therapy Consult: No

## 2020-12-06 NOTE — ROUTINE PROCESS
Bedside and Verbal shift change report given to LEONA Johnson R.N. (oncoming nurse) by LEONA Santos R.N. (offgoing nurse). Report included the following information SBAR, Kardex, Intake/Output, MAR, Accordion, Recent Results and Med Rec Status.

## 2020-12-06 NOTE — PROGRESS NOTES
Problem: Airway Clearance - Ineffective  Goal: Achieve or maintain patent airway  12/5/2020 2321 by Agnieszka Lundberg  Outcome: Progressing Towards Goal  12/5/2020 2320 by Quita Johnson  Outcome: Progressing Towards Goal     Problem: Breathing Pattern - Ineffective  Goal: Ability to achieve and maintain a regular respiratory rate  12/5/2020 2321 by Quita Johnson  Outcome: Progressing Towards Goal  12/5/2020 2320 by Quita Johnson  Outcome: Progressing Towards Goal     Problem: Isolation Precautions - Risk of Spread of Infection  Goal: Prevent transmission of infectious organism to others  Outcome: Progressing Towards Goal     Problem: Risk for Fluid Volume Deficit  Goal: Maintain normal heart rhythm  Outcome: Progressing Towards Goal     Problem: Falls - Risk of  Goal: *Absence of Falls  Description: Document Josr Thompson Fall Risk and appropriate interventions in the flowsheet.   Outcome: Progressing Towards Goal  Note: Fall Risk Interventions:            Medication Interventions: Teach patient to arise slowly, Patient to call before getting OOB

## 2020-12-06 NOTE — PROGRESS NOTES
Hospitalist Progress Note    Patient: Claire Neves MRN: [id number]  CSN: [id number]    YOB: 1940  Age: [de-identified] y.o. Sex: male    DOA: 12/4/2020 LOS:  LOS: 1 day          Chief Complaint:      Assessment/Plan     [de-identified] y/o healthy male with HTN admitted with mild hypoxemia due to COVID infection. His O2 saturation in the ED was 92% but was 96-97% at the time hospitalist admission. Admitted to monitor him for clinical worsening due to borderline oxygenation, age, and the fact he lives alone.     Hypoxemia due to COVID infection  -pulse oximetry  -nasal cannula as needed for O2 sat <94%  -decadron IV tonight and PO tomorrow  -pepcid PO   -antioxidant cocktail  -COVID labs  -agreed to receive convalescent plasma if worsening, will start if consistently requiring oxygen  -remdesivir held  for now unless clinical worsening    Thrombocytopenia   -presenting platelets 973, today is 46  -nurse held lovenox   -follow CBC, check peripheral blood smear, and test for human immunodeficiency virus (HIV) and hepatitis C virus (HCV)  -consider hematology consult     Hypertension   -will monitor   -SBP in 140s     Full code     Prophylaxis: pepcid PO, low dose lovenox SQ >held for now, will order SCDs     Estimated length of stay : 2 nights    Disposition :  Patient Active Problem List   Diagnosis Code    Cough R05    Body aches R52    Suspected COVID-19 virus infection Z20.828    Hypoxemia R09.02    COVID-19 U07.1    Hypertension I10       Subjective:    Pt feeling good from a respiratory perspective. Denies sob, db or penny. RN alerted me that his platelets dropped to 46, so she held lovenox. His main concern is that he is constipated and has a HA. Has received both pain meds and miralax.        Review of systems:    Constitutional: denies fevers, chills, myalgias  Respiratory: denies SOB, cough  Cardiovascular: denies chest pain, palpitations  Gastrointestinal: denies nausea, vomiting, diarrhea      Vital signs/Intake and Output:  Visit Vitals  /67   Pulse 66   Temp 98 °F (36.7 °C)   Resp 20   Ht 5' 7\" (1.702 m)   Wt 84.7 kg (186 lb 12.8 oz)   SpO2 98%   BMI 29.26 kg/m²     Current Shift:  No intake/output data recorded. Last three shifts:  12/04 1901 - 12/06 0700  In: 220 [P.O.:220]  Out: -     Exam:    General: Well developed, alert, NAD, OX3  Head/Neck: NCAT, supple, No masses, No lymphadenopathy  CVS:Regular rate and rhythm, no M/R/G, S1/S2 heard, no thrill  Lungs:Clear to auscultation bilaterally, no wheezes, rhonchi, or rales  Abdomen: Soft, Nontender, No distention, Normal Bowel sounds, No hepatomegaly  Extremities: No C/C/E, pulses palpable 2+  Skin:normal texture and turgor, no rashes, no lesions  Neuro:grossly normal , follows commands  Psych:appropriate                Labs: Results:       Chemistry Recent Labs     12/06/20 0430 12/04/20 2315   GLU 87 110*    137   K 4.1 3.7    103   CO2 28 24   BUN 23* 16   CREA 0.98 1.07   CA 8.4* 8.2*   AGAP 6 10   BUCR 23* 15   AP 69 77   TP 6.6 7.3   ALB 2.7* 3.1*   GLOB 3.9 4.2*   AGRAT 0.7* 0.7*      CBC w/Diff Recent Labs     12/06/20 0430 12/04/20 2315   WBC 9.0 5.6   RBC 4.64* 4.78   HGB 14.5 14.7   HCT 41.4 42.5   PLT 46* 102*   GRANS  --  76*   LYMPH  --  14*   EOS  --  0      Cardiac Enzymes Recent Labs     12/04/20 2315   CPK 84   CKND1 CALCULATION NOT PERFORMED WHEN RESULT IS BELOW LINEAR LIMIT      Coagulation Recent Labs     12/04/20 2315   PTP 13.9   INR 1.1   APTT 29.6       Lipid Panel No results found for: CHOL, CHOLPOCT, CHOLX, CHLST, CHOLV, 027191, HDL, HDLP, LDL, LDLC, DLDLP, 301046, VLDLC, VLDL, TGLX, TRIGL, TRIGP, TGLPOCT, CHHD, CHHDX   BNP No results for input(s): BNPP in the last 72 hours.    Liver Enzymes Recent Labs     12/06/20  0430   TP 6.6   ALB 2.7*   AP 69      Thyroid Studies No results found for: T4, T3U, TSH, TSHEXT     Procedures/imaging: see electronic medical records for all procedures/Xrays and details which were not copied into this note but were reviewed prior to creation of Demario Siegel MD

## 2020-12-06 NOTE — PROGRESS NOTES
Patient rested well overnight. Bedside and Verbal shift change report given to Azalia Collazo RN (oncoming nurse) by Summer Alcala RN (offgoing nurse). Report included the following information SBAR, Kardex, Intake/Output, MAR, Accordion, Recent Results and Med Rec Status.

## 2020-12-06 NOTE — PROGRESS NOTES
0716:Received verbal bedside report from off going nurse LEONA Johnson R.N. Patient care received. Patient alert and oriented x 4. Patient resting in bed denies pain. Patient stable. Call light with in reach bed in lowest position. 0826:Held patient's Lovenox because patient platelet count is 46. Will make hospitalist aware. WIll continue to monitor patient. 1121: Informed EMMY Patel that patient's platelet count was 46 this morning and that his Lovenox was held. No orders received will continue to monitor patient. 1152:Pt administered PRN Tylenol for headache and PRN Miralax because he said he has not had a good bowel movement since Friday. Will continue to monitor patient. 1355:Dr.Telue- Norton said that Q 6 blood sugars can be discontinued for patient.

## 2020-12-07 LAB
ALBUMIN SERPL-MCNC: 2.6 G/DL (ref 3.4–5)
ALBUMIN/GLOB SERPL: 0.6 {RATIO} (ref 0.8–1.7)
ALP SERPL-CCNC: 65 U/L (ref 45–117)
ALT SERPL-CCNC: 8 U/L (ref 16–61)
ANION GAP SERPL CALC-SCNC: 5 MMOL/L (ref 3–18)
AST SERPL-CCNC: 15 U/L (ref 10–38)
BILIRUB SERPL-MCNC: 0.3 MG/DL (ref 0.2–1)
BUN SERPL-MCNC: 20 MG/DL (ref 7–18)
BUN/CREAT SERPL: 21 (ref 12–20)
CALCIUM SERPL-MCNC: 8.4 MG/DL (ref 8.5–10.1)
CHLORIDE SERPL-SCNC: 108 MMOL/L (ref 100–111)
CO2 SERPL-SCNC: 27 MMOL/L (ref 21–32)
CREAT SERPL-MCNC: 0.96 MG/DL (ref 0.6–1.3)
D DIMER PPP FEU-MCNC: 1.8 UG/ML(FEU)
ERYTHROCYTE [DISTWIDTH] IN BLOOD BY AUTOMATED COUNT: 14.8 % (ref 11.6–14.5)
FIBRINOGEN PPP-MCNC: 581 MG/DL (ref 210–451)
GLOBULIN SER CALC-MCNC: 4.1 G/DL (ref 2–4)
GLUCOSE SERPL-MCNC: 95 MG/DL (ref 74–99)
HAV IGM SER QL: NEGATIVE
HBV CORE IGM SER QL: NEGATIVE
HBV SURFACE AG SER QL: <0.1 INDEX
HBV SURFACE AG SER QL: NEGATIVE
HCT VFR BLD AUTO: 41.2 % (ref 36–48)
HCV AB SER IA-ACNC: 0.03 INDEX
HCV AB SERPL QL IA: NEGATIVE
HCV COMMENT,HCGAC: NORMAL
HGB BLD-MCNC: 14.3 G/DL (ref 13–16)
HIV 1+2 AB+HIV1 P24 AG SERPL QL IA: NONREACTIVE
HIV12 RESULT COMMENT, HHIVC: NORMAL
MCH RBC QN AUTO: 30.9 PG (ref 24–34)
MCHC RBC AUTO-ENTMCNC: 34.7 G/DL (ref 31–37)
MCV RBC AUTO: 89 FL (ref 74–97)
PLATELET # BLD AUTO: 41 K/UL (ref 135–420)
POTASSIUM SERPL-SCNC: 4.2 MMOL/L (ref 3.5–5.5)
PROT SERPL-MCNC: 6.7 G/DL (ref 6.4–8.2)
RBC # BLD AUTO: 4.63 M/UL (ref 4.7–5.5)
SODIUM SERPL-SCNC: 140 MMOL/L (ref 136–145)
SP1: NORMAL
SP2: NORMAL
SP3: NORMAL
WBC # BLD AUTO: 5.6 K/UL (ref 4.6–13.2)

## 2020-12-07 PROCEDURE — 74011250637 HC RX REV CODE- 250/637: Performed by: FAMILY MEDICINE

## 2020-12-07 PROCEDURE — 85384 FIBRINOGEN ACTIVITY: CPT

## 2020-12-07 PROCEDURE — 80074 ACUTE HEPATITIS PANEL: CPT

## 2020-12-07 PROCEDURE — 80053 COMPREHEN METABOLIC PANEL: CPT

## 2020-12-07 PROCEDURE — 85027 COMPLETE CBC AUTOMATED: CPT

## 2020-12-07 PROCEDURE — 85379 FIBRIN DEGRADATION QUANT: CPT

## 2020-12-07 PROCEDURE — 74011250636 HC RX REV CODE- 250/636: Performed by: FAMILY MEDICINE

## 2020-12-07 PROCEDURE — 87389 HIV-1 AG W/HIV-1&-2 AB AG IA: CPT

## 2020-12-07 PROCEDURE — 65660000000 HC RM CCU STEPDOWN

## 2020-12-07 RX ADMIN — Medication 10 ML: at 17:00

## 2020-12-07 RX ADMIN — Medication 10 ML: at 20:49

## 2020-12-07 RX ADMIN — FAMOTIDINE 20 MG: 20 TABLET, FILM COATED ORAL at 20:40

## 2020-12-07 RX ADMIN — Medication 6 TABLET: at 09:21

## 2020-12-07 RX ADMIN — Medication 500 MG: at 20:39

## 2020-12-07 RX ADMIN — Medication 500 MG: at 09:20

## 2020-12-07 RX ADMIN — DEXAMETHASONE 6 MG: 4 TABLET ORAL at 09:20

## 2020-12-07 RX ADMIN — FAMOTIDINE 20 MG: 20 TABLET, FILM COATED ORAL at 09:21

## 2020-12-07 RX ADMIN — Medication 1 CAPSULE: at 09:20

## 2020-12-07 RX ADMIN — Medication 10 MG: at 20:49

## 2020-12-07 RX ADMIN — Medication 1 CAPSULE: at 20:39

## 2020-12-07 NOTE — PROGRESS NOTES
Hospitalist Progress Note    Patient: Diaz De Leonr MRN: [id number]  CSN: [id number]    YOB: 1940  Age: [de-identified] y.o. Sex: male    DOA: 12/4/2020 LOS:  LOS: 2 days          Chief Complaint:      Assessment/Plan     [de-identified] y/o healthy male with HTN admitted with mild hypoxemia due to COVID infection. His O2 saturation in the ED was 92% but was 96-97% at the time hospitalist admission. Admitted to monitor him for clinical worsening due to borderline oxygenation, age, and the fact he lives alone.     Hypoxemia due to COVID infection  -pulse oximetry  -nasal cannula as needed for O2 sat <94%  -decadron IV tonight and PO tomorrow  -pepcid PO   -antioxidant cocktail  -COVID labs  -agreed to receive convalescent plasma if worsening, will start if consistently requiring oxygen  -remdesivir held  for now unless clinical worsening    Thrombocytopenia   -presenting platelets 765, today is 41  -nurse held lovenox   -follow CBC, check peripheral blood smear, and test for human immunodeficiency virus (HIV) and hepatitis C virus (HCV)  -consider hematology consult     Hypertension   -will monitor   -SBP in 140s     Full code     Prophylaxis: pepcid PO, low dose lovenox SQ >held for now, will order SCDs     Estimated length of stay : 2 nights    Disposition :  Patient Active Problem List   Diagnosis Code    Cough R05    Body aches R52    Suspected COVID-19 virus infection Z20.828    Hypoxemia R09.02    COVID-19 U07.1    Hypertension I10       Subjective:    Has no complaints. Wants to know when he can go home.        Review of systems:    Constitutional: denies fevers, chills, myalgias  Respiratory: denies SOB, cough  Cardiovascular: denies chest pain, palpitations  Gastrointestinal: denies nausea, vomiting, diarrhea      Vital signs/Intake and Output:  Visit Vitals  BP (!) 162/74 (BP 1 Location: Left arm, BP Patient Position: At rest)   Pulse (!) 53   Temp 97.5 °F (36.4 °C)   Resp 18   Ht 5' 7\" (1.702 m)   Wt 84.7 kg (186 lb 12.8 oz)   SpO2 100%   BMI 29.26 kg/m²     Current Shift:  No intake/output data recorded. Last three shifts:  12/05 1901 - 12/07 0700  In: 480 [P.O.:480]  Out: -     Exam:    General: Well developed, alert, NAD, OX3  Head/Neck: NCAT, supple, No masses, No lymphadenopathy  CVS:Regular rate and rhythm, no M/R/G, S1/S2 heard, no thrill  Lungs:Clear to auscultation bilaterally, no wheezes, rhonchi, or rales  Abdomen: Soft, Nontender, No distention, Normal Bowel sounds, No hepatomegaly  Extremities: No C/C/E, pulses palpable 2+  Skin:normal texture and turgor, no rashes, no lesions  Neuro:grossly normal , follows commands  Psych:appropriate                Labs: Results:       Chemistry Recent Labs     12/07/20 0420 12/06/20 0430 12/04/20 2315   GLU 95 87 110*    140 137   K 4.2 4.1 3.7    106 103   CO2 27 28 24   BUN 20* 23* 16   CREA 0.96 0.98 1.07   CA 8.4* 8.4* 8.2*   AGAP 5 6 10   BUCR 21* 23* 15   AP 65 69 77   TP 6.7 6.6 7.3   ALB 2.6* 2.7* 3.1*   GLOB 4.1* 3.9 4.2*   AGRAT 0.6* 0.7* 0.7*      CBC w/Diff Recent Labs     12/07/20 0420 12/06/20 0430 12/04/20 2315   WBC 5.6 9.0 5.6   RBC 4.63* 4.64* 4.78   HGB 14.3 14.5 14.7   HCT 41.2 41.4 42.5   PLT 41* 46* 102*   GRANS  --   --  76*   LYMPH  --   --  14*   EOS  --   --  0      Cardiac Enzymes Recent Labs     12/04/20 2315   CPK 84   CKND1 CALCULATION NOT PERFORMED WHEN RESULT IS BELOW LINEAR LIMIT      Coagulation Recent Labs     12/04/20 2315   PTP 13.9   INR 1.1   APTT 29.6       Lipid Panel No results found for: CHOL, CHOLPOCT, CHOLX, CHLST, CHOLV, 076640, HDL, HDLP, LDL, LDLC, DLDLP, 316926, VLDLC, VLDL, TGLX, TRIGL, TRIGP, TGLPOCT, CHHD, CHHDX   BNP No results for input(s): BNPP in the last 72 hours.    Liver Enzymes Recent Labs     12/07/20  0420   TP 6.7   ALB 2.6*   AP 65      Thyroid Studies No results found for: T4, T3U, TSH, TSHEXT, TSHEXT     Procedures/imaging: see electronic medical records for all procedures/Xrays and details which were not copied into this note but were reviewed prior to creation of Jonathan Babin MD

## 2020-12-07 NOTE — PROGRESS NOTES
1900: Assumed care of the patient at this time. 0700: No acute change overnight. Bedside and Verbal shift change report given to Suni Meier RN (oncoming nurse) by Brian Lopez RN (offgoing nurse). Report included the following information SBAR, Kardex, Intake/Output, MAR, Accordion, Recent Results and Med Rec Status.

## 2020-12-07 NOTE — PROGRESS NOTES
Problem: Falls - Risk of  Goal: *Absence of Falls  Description: Document Edmonia Morning Fall Risk and appropriate interventions in the flowsheet.   Outcome: Progressing Towards Goal  Note: Fall Risk Interventions:            Medication Interventions: Teach patient to arise slowly

## 2020-12-07 NOTE — PROGRESS NOTES
0715-Assumed care of patient at this time from [facility].     0934-head to toe assessment performed at this time, see flow sheet. 1925 - Bedside and Verbal shift change report given to Trice Kaur by Evette Downey RN. Report included the following information SBAR, Kardex, OR Summary, Intake/Output and MAR.

## 2020-12-07 NOTE — PROGRESS NOTES
Problem: Airway Clearance - Ineffective  Goal: Achieve or maintain patent airway  Outcome: Progressing Towards Goal     Problem: Gas Exchange - Impaired  Goal: Absence of hypoxia  Outcome: Progressing Towards Goal  Goal: Promote optimal lung function  Outcome: Progressing Towards Goal     Problem: Breathing Pattern - Ineffective  Goal: Ability to achieve and maintain a regular respiratory rate  Outcome: Progressing Towards Goal     Problem: Risk for Fluid Volume Deficit  Goal: Maintain normal heart rhythm  Outcome: Progressing Towards Goal  Goal: Maintain absence of muscle cramping  Outcome: Progressing Towards Goal  Goal: Maintain normal serum potassium, sodium, calcium, phosphorus, and pH  Outcome: Progressing Towards Goal     Problem: Falls - Risk of  Goal: *Absence of Falls  Description: Document Carlene Fall Risk and appropriate interventions in the flowsheet.   Outcome: Progressing Towards Goal  Note: Fall Risk Interventions:            Medication Interventions: Teach patient to arise slowly, Patient to call before getting OOB

## 2020-12-07 NOTE — WOUND CARE
Wound Care Note:    No pressure injuries observed during prevalence    Skin Care & Pressure Relief Recommendations:  Minimize layers of linen  Pads under patient to optimize support surface and microclimate  Turn/reposition approximately every 2 hours. Pillow Wedges  Manage incontinence  Promote continence; Skin Protective lotion to buttocks and sacrum daily and as needed with incontinence care    Offload heels with pillows or offloading boots.     Discussed above plan with Shikha Couch RN,     Transition of Care: Plan to follow weekly while admitted to hospital.

## 2020-12-08 LAB
ALBUMIN SERPL-MCNC: 2.7 G/DL (ref 3.4–5)
ALBUMIN/GLOB SERPL: 0.7 {RATIO} (ref 0.8–1.7)
ALP SERPL-CCNC: 62 U/L (ref 45–117)
ALT SERPL-CCNC: 13 U/L (ref 16–61)
ANION GAP SERPL CALC-SCNC: 6 MMOL/L (ref 3–18)
AST SERPL-CCNC: 18 U/L (ref 10–38)
BILIRUB SERPL-MCNC: 0.3 MG/DL (ref 0.2–1)
BUN SERPL-MCNC: 22 MG/DL (ref 7–18)
BUN/CREAT SERPL: 23 (ref 12–20)
CALCIUM SERPL-MCNC: 8.7 MG/DL (ref 8.5–10.1)
CHLORIDE SERPL-SCNC: 108 MMOL/L (ref 100–111)
CO2 SERPL-SCNC: 27 MMOL/L (ref 21–32)
CREAT SERPL-MCNC: 0.94 MG/DL (ref 0.6–1.3)
D DIMER PPP FEU-MCNC: 0.93 UG/ML(FEU)
ERYTHROCYTE [DISTWIDTH] IN BLOOD BY AUTOMATED COUNT: 14.5 % (ref 11.6–14.5)
FIBRINOGEN PPP-MCNC: 535 MG/DL (ref 210–451)
GLOBULIN SER CALC-MCNC: 4.1 G/DL (ref 2–4)
GLUCOSE SERPL-MCNC: 96 MG/DL (ref 74–99)
HCT VFR BLD AUTO: 42.3 % (ref 36–48)
HGB BLD-MCNC: 14.8 G/DL (ref 13–16)
MCH RBC QN AUTO: 31.1 PG (ref 24–34)
MCHC RBC AUTO-ENTMCNC: 35 G/DL (ref 31–37)
MCV RBC AUTO: 88.9 FL (ref 74–97)
PLATELET # BLD AUTO: 58 K/UL (ref 135–420)
POTASSIUM SERPL-SCNC: 4.1 MMOL/L (ref 3.5–5.5)
PROT SERPL-MCNC: 6.8 G/DL (ref 6.4–8.2)
RBC # BLD AUTO: 4.76 M/UL (ref 4.7–5.5)
SODIUM SERPL-SCNC: 141 MMOL/L (ref 136–145)
WBC # BLD AUTO: 7.9 K/UL (ref 4.6–13.2)

## 2020-12-08 PROCEDURE — 65660000000 HC RM CCU STEPDOWN

## 2020-12-08 PROCEDURE — 74011250637 HC RX REV CODE- 250/637: Performed by: FAMILY MEDICINE

## 2020-12-08 PROCEDURE — 85379 FIBRIN DEGRADATION QUANT: CPT

## 2020-12-08 PROCEDURE — 80053 COMPREHEN METABOLIC PANEL: CPT

## 2020-12-08 PROCEDURE — 74011250636 HC RX REV CODE- 250/636: Performed by: FAMILY MEDICINE

## 2020-12-08 PROCEDURE — 85027 COMPLETE CBC AUTOMATED: CPT

## 2020-12-08 PROCEDURE — 85384 FIBRINOGEN ACTIVITY: CPT

## 2020-12-08 RX ADMIN — Medication 5 ML: at 14:00

## 2020-12-08 RX ADMIN — Medication 500 MG: at 20:52

## 2020-12-08 RX ADMIN — Medication 10 ML: at 08:26

## 2020-12-08 RX ADMIN — Medication 6 TABLET: at 11:55

## 2020-12-08 RX ADMIN — Medication 1 CAPSULE: at 20:51

## 2020-12-08 RX ADMIN — FAMOTIDINE 20 MG: 20 TABLET, FILM COATED ORAL at 20:52

## 2020-12-08 RX ADMIN — Medication 10 ML: at 22:11

## 2020-12-08 RX ADMIN — Medication 10 MG: at 21:50

## 2020-12-08 RX ADMIN — Medication 500 MG: at 08:26

## 2020-12-08 RX ADMIN — FAMOTIDINE 20 MG: 20 TABLET, FILM COATED ORAL at 08:26

## 2020-12-08 RX ADMIN — Medication 1 CAPSULE: at 08:26

## 2020-12-08 RX ADMIN — DEXAMETHASONE 6 MG: 4 TABLET ORAL at 08:26

## 2020-12-08 NOTE — PROGRESS NOTES
Hospitalist Progress Note    Patient: Portillo Torres MRN: [id number]  CSN: [id number]    YOB: 1940  Age: [de-identified] y.o. Sex: male    DOA: 12/4/2020 LOS:  LOS: 3 days          Chief Complaint:      Assessment/Plan     [de-identified] y/o healthy male with HTN admitted with mild hypoxemia due to COVID infection. His O2 saturation in the ED was 92% but was 96-97% at the time hospitalist admission. Admitted to monitor him for clinical worsening due to borderline oxygenation, age, and the fact he lives alone.     Hypoxemia due to COVID infection  -pulse oximetry  -nasal cannula as needed for O2 sat <94%  -decadron IV tonight and PO tomorrow  -pepcid PO   -antioxidant cocktail  -COVID labs  -agreed to receive convalescent plasma if worsening, will start if consistently requiring oxygen  -remdesivir held  for now unless clinical worsening    Thrombocytopenia   -presenting platelets 141, today is 41  -nurse held lovenox   -follow CBC, check peripheral blood smear, and test for human immunodeficiency virus (HIV) and hepatitis C virus (HCV)  -consider hematology consult if platelets   D/w Dr. Nadeem Noland of hematology   -encouraged by fact that platelets are improving now  -place HIT protocol   -AVOID heparin   -daily platelets   -call him back if platelets decrease again     Hypertension   -will monitor   -SBP in 140s     Full code     Prophylaxis: pepcid PO, low dose lovenox SQ >held for now, will order SCDs     Estimated length of stay : 2 nights    Disposition :  Patient Active Problem List   Diagnosis Code    Cough R05    Body aches R52    Suspected COVID-19 virus infection Z20.828    Hypoxemia R09.02    COVID-19 U07.1    Hypertension I10       Subjective:    Has no complaints. Wants to know when he can go home.        Review of systems:    Constitutional: denies fevers, chills, myalgias  Respiratory: denies SOB, cough  Cardiovascular: denies chest pain, palpitations  Gastrointestinal: denies nausea, vomiting, diarrhea      Vital signs/Intake and Output:  Visit Vitals  BP (!) 163/75   Pulse (!) 54   Temp 97.3 °F (36.3 °C)   Resp 18   Ht 5' 7\" (1.702 m)   Wt 84.7 kg (186 lb 12.8 oz)   SpO2 99%   BMI 29.26 kg/m²     Current Shift:  12/08 0701 - 12/08 1900  In: 240 [P.O.:240]  Out: -   Last three shifts:  12/06 1901 - 12/08 0700  In: 880 [P.O.:880]  Out: -     Exam:    General: Well developed, alert, NAD, OX3  Head/Neck: NCAT, supple, No masses, No lymphadenopathy  CVS:Regular rate and rhythm, no M/R/G, S1/S2 heard, no thrill  Lungs:Clear to auscultation bilaterally, no wheezes, rhonchi, or rales  Abdomen: Soft, Nontender, No distention, Normal Bowel sounds, No hepatomegaly  Extremities: No C/C/E, pulses palpable 2+  Skin:normal texture and turgor, no rashes, no lesions  Neuro:grossly normal , follows commands  Psych:appropriate                Labs: Results:       Chemistry Recent Labs     12/08/20 0540 12/07/20 0420 12/06/20  0430   GLU 96 95 87    140 140   K 4.1 4.2 4.1    108 106   CO2 27 27 28   BUN 22* 20* 23*   CREA 0.94 0.96 0.98   CA 8.7 8.4* 8.4*   AGAP 6 5 6   BUCR 23* 21* 23*   AP 62 65 69   TP 6.8 6.7 6.6   ALB 2.7* 2.6* 2.7*   GLOB 4.1* 4.1* 3.9   AGRAT 0.7* 0.6* 0.7*      CBC w/Diff Recent Labs     12/08/20 0540 12/07/20 0420 12/06/20  0430   WBC 7.9 5.6 9.0   RBC 4.76 4.63* 4.64*   HGB 14.8 14.3 14.5   HCT 42.3 41.2 41.4   PLT 58* 41* 46*      Cardiac Enzymes No results for input(s): CPK, CKND1, FRANCIA in the last 72 hours. No lab exists for component: CKRMB, TROIP   Coagulation No results for input(s): PTP, INR, APTT, INREXT, INREXT in the last 72 hours. Lipid Panel No results found for: CHOL, CHOLPOCT, CHOLX, CHLST, CHOLV, 034784, HDL, HDLP, LDL, LDLC, DLDLP, 644199, VLDLC, VLDL, TGLX, TRIGL, TRIGP, TGLPOCT, CHHD, CHHDX   BNP No results for input(s): BNPP in the last 72 hours.    Liver Enzymes Recent Labs     12/08/20  0540   TP 6.8   ALB 2.7*   AP 62      Thyroid Studies No results found for: T4, T3U, TSH, TSHEXT, TSHEXT     Procedures/imaging: see electronic medical records for all procedures/Xrays and details which were not copied into this note but were reviewed prior to creation of Lien Breen MD

## 2020-12-08 NOTE — PROGRESS NOTES
Problem: Airway Clearance - Ineffective  Goal: Achieve or maintain patent airway  Outcome: Progressing Towards Goal     Problem: Gas Exchange - Impaired  Goal: Absence of hypoxia  Outcome: Progressing Towards Goal  Goal: Promote optimal lung function  Outcome: Progressing Towards Goal     Problem: Breathing Pattern - Ineffective  Goal: Ability to achieve and maintain a regular respiratory rate  Outcome: Progressing Towards Goal     Problem:  Body Temperature -  Risk of, Imbalanced  Goal: Ability to maintain a body temperature within defined limits  Outcome: Progressing Towards Goal  Goal: Will regain or maintain usual level of consciousness  Outcome: Progressing Towards Goal  Goal: Complications related to the disease process, condition or treatment will be avoided or minimized  Outcome: Progressing Towards Goal     Problem: Isolation Precautions - Risk of Spread of Infection  Goal: Prevent transmission of infectious organism to others  Outcome: Progressing Towards Goal     Problem: Nutrition Deficits  Goal: Optimize nutrtional status  Outcome: Progressing Towards Goal     Problem: Risk for Fluid Volume Deficit  Goal: Maintain normal heart rhythm  Outcome: Progressing Towards Goal  Goal: Maintain absence of muscle cramping  Outcome: Progressing Towards Goal  Goal: Maintain normal serum potassium, sodium, calcium, phosphorus, and pH  Outcome: Progressing Towards Goal     Problem: Loneliness or Risk for Loneliness  Goal: Demonstrate positive use of time alone when socialization is not possible  Outcome: Progressing Towards Goal     Problem: Fatigue  Goal: Verbalize increase energy and improved vitality  Outcome: Progressing Towards Goal     Problem: Patient Education: Go to Patient Education Activity  Goal: Patient/Family Education  Outcome: Progressing Towards Goal     Problem: Airway Clearance - Ineffective  Goal: *Patent airway  Outcome: Progressing Towards Goal  Goal: *Absence of airway secretions  Outcome: Progressing Towards Goal  Goal: *Able to cough effectively  Outcome: Progressing Towards Goal  Goal: *PALLIATIVE CARE:  Alleviation of secretions, cough and/or nasal congestion  Outcome: Progressing Towards Goal     Problem: Patient Education:  Go to Education Activity  Goal: Patient/Family Education  Outcome: Progressing Towards Goal     Problem: Patient Education: Go to Patient Education Activity  Goal: Patient/Family Education  Outcome: Progressing Towards Goal     Problem: Falls - Risk of  Goal: *Absence of Falls  Description: Document Gary Going Fall Risk and appropriate interventions in the flowsheet. Outcome: Progressing Towards Goal  Note: Fall Risk Interventions:            Medication Interventions: Teach patient to arise slowly                   Problem: Patient Education: Go to Patient Education Activity  Goal: Patient/Family Education  Outcome: Progressing Towards Goal     Problem: Falls - Risk of  Goal: *Absence of Falls  Description: Document Gary Going Fall Risk and appropriate interventions in the flowsheet.   Outcome: Progressing Towards Goal     Problem: Patient Education: Go to Patient Education Activity  Goal: Patient/Family Education  Outcome: Progressing Towards Goal

## 2020-12-08 NOTE — PROGRESS NOTES
Problem: Airway Clearance - Ineffective  Goal: Achieve or maintain patent airway  Outcome: Progressing Towards Goal     Problem: Gas Exchange - Impaired  Goal: Absence of hypoxia  Outcome: Progressing Towards Goal  Goal: Promote optimal lung function  Outcome: Progressing Towards Goal     Problem: Breathing Pattern - Ineffective  Goal: Ability to achieve and maintain a regular respiratory rate  Outcome: Progressing Towards Goal     Problem:  Body Temperature -  Risk of, Imbalanced  Goal: Ability to maintain a body temperature within defined limits  Outcome: Progressing Towards Goal  Goal: Will regain or maintain usual level of consciousness  Outcome: Progressing Towards Goal  Goal: Complications related to the disease process, condition or treatment will be avoided or minimized  Outcome: Progressing Towards Goal     Problem: Isolation Precautions - Risk of Spread of Infection  Goal: Prevent transmission of infectious organism to others  Outcome: Progressing Towards Goal     Problem: Nutrition Deficits  Goal: Optimize nutrtional status  Outcome: Progressing Towards Goal     Problem: Risk for Fluid Volume Deficit  Goal: Maintain normal heart rhythm  Outcome: Progressing Towards Goal  Goal: Maintain absence of muscle cramping  Outcome: Progressing Towards Goal  Goal: Maintain normal serum potassium, sodium, calcium, phosphorus, and pH  Outcome: Progressing Towards Goal     Problem: Loneliness or Risk for Loneliness  Goal: Demonstrate positive use of time alone when socialization is not possible  Outcome: Progressing Towards Goal     Problem: Fatigue  Goal: Verbalize increase energy and improved vitality  Outcome: Progressing Towards Goal     Problem: Patient Education: Go to Patient Education Activity  Goal: Patient/Family Education  Outcome: Progressing Towards Goal     Problem: Risk for Spread of Infection  Goal: Prevent transmission of infectious organism to others  Description: Prevent the transmission of infectious organisms to other patients, staff members, and visitors. Outcome: Progressing Towards Goal     Problem: Patient Education:  Go to Education Activity  Goal: Patient/Family Education  Outcome: Progressing Towards Goal     Problem: Airway Clearance - Ineffective  Goal: *Patent airway  Outcome: Progressing Towards Goal  Goal: *Absence of airway secretions  Outcome: Progressing Towards Goal  Goal: *Able to cough effectively  Outcome: Progressing Towards Goal  Goal: *PALLIATIVE CARE:  Alleviation of secretions, cough and/or nasal congestion  Outcome: Progressing Towards Goal     Problem: Patient Education: Go to Patient Education Activity  Goal: Patient/Family Education  Outcome: Progressing Towards Goal     Problem: Falls - Risk of  Goal: *Absence of Falls  Description: Document Carlene Fall Risk and appropriate interventions in the flowsheet. Outcome: Progressing Towards Goal  Note: Fall Risk Interventions:            Medication Interventions: Teach patient to arise slowly                   Problem: Patient Education: Go to Patient Education Activity  Goal: Patient/Family Education  Outcome: Progressing Towards Goal     Problem: Falls - Risk of  Goal: *Absence of Falls  Description: Document Jason Apo Fall Risk and appropriate interventions in the flowsheet.   Outcome: Progressing Towards Goal     Problem: Patient Education: Go to Patient Education Activity  Goal: Patient/Family Education  Outcome: Progressing Towards Goal

## 2020-12-08 NOTE — PROGRESS NOTES
Bedside and Verbal shift change report given to ZEUS Gomez RN (oncoming nurse) by Jame Hogan RN (offgoing nurse). Report included the following information SBAR, Kardex, Intake/Output, MAR and Recent Results     0822 Shift assessment complete. Patient alert & oriented x 4 and denies pain at time of assessment. Lung sounds diminished bilaterally & abd sounds present x4 quadrants. Breakfast tray delivered, андрей light within reach & white board updated. Shift uneventful. Bedside and Verbal shift change report given to Tabitha (oncoming nurse) by ZEUS Gomez RN (offgoing nurse). Report included the following information SBAR, Kardex, Intake/Output, MAR and Recent Results.

## 2020-12-08 NOTE — PROGRESS NOTES
1915  Assumed care of pt   2044  Shift assessment complete - pt talking on phone with family members   [phone]  Reassessment complete  [phone]  Reassessment complete  successful lab draw by nurse       3 [facility] Cardiac/Medical Night Shift Chart Audit    Chart Audit completed? YES    Bedside and Verbal shift change report given to Tati Montes De Oca RN (oncoming nurse) by Tory Littlejohn RN (offgoing nurse). Report included the following information SBAR, Kardex, ED Summary, Intake/Output, MAR, Recent Results, Med Rec Status and Cardiac Rhythm NSR.

## 2020-12-09 ENCOUNTER — APPOINTMENT (OUTPATIENT)
Dept: VASCULAR SURGERY | Age: 80
DRG: 177 | End: 2020-12-09
Attending: FAMILY MEDICINE
Payer: MEDICARE

## 2020-12-09 LAB
ALBUMIN SERPL-MCNC: 2.5 G/DL (ref 3.4–5)
ALBUMIN/GLOB SERPL: 0.7 {RATIO} (ref 0.8–1.7)
ALP SERPL-CCNC: 60 U/L (ref 45–117)
ALT SERPL-CCNC: 14 U/L (ref 16–61)
ANION GAP SERPL CALC-SCNC: 5 MMOL/L (ref 3–18)
AST SERPL-CCNC: 14 U/L (ref 10–38)
BILIRUB SERPL-MCNC: 0.4 MG/DL (ref 0.2–1)
BUN SERPL-MCNC: 23 MG/DL (ref 7–18)
BUN/CREAT SERPL: 26 (ref 12–20)
CALCIUM SERPL-MCNC: 8.3 MG/DL (ref 8.5–10.1)
CHLORIDE SERPL-SCNC: 110 MMOL/L (ref 100–111)
CO2 SERPL-SCNC: 26 MMOL/L (ref 21–32)
CREAT SERPL-MCNC: 0.9 MG/DL (ref 0.6–1.3)
D DIMER PPP FEU-MCNC: 0.82 UG/ML(FEU)
ERYTHROCYTE [DISTWIDTH] IN BLOOD BY AUTOMATED COUNT: 14.6 % (ref 11.6–14.5)
FIBRINOGEN PPP-MCNC: 463 MG/DL (ref 210–451)
GLOBULIN SER CALC-MCNC: 3.8 G/DL (ref 2–4)
GLUCOSE SERPL-MCNC: 108 MG/DL (ref 74–99)
HCT VFR BLD AUTO: 39.3 % (ref 36–48)
HGB BLD-MCNC: 13.6 G/DL (ref 13–16)
MCH RBC QN AUTO: 30.7 PG (ref 24–34)
MCHC RBC AUTO-ENTMCNC: 34.6 G/DL (ref 31–37)
MCV RBC AUTO: 88.7 FL (ref 74–97)
PLATELET # BLD AUTO: 77 K/UL (ref 135–420)
PMV BLD AUTO: 12.5 FL (ref 9.2–11.8)
POTASSIUM SERPL-SCNC: 4.2 MMOL/L (ref 3.5–5.5)
PROT SERPL-MCNC: 6.3 G/DL (ref 6.4–8.2)
RBC # BLD AUTO: 4.43 M/UL (ref 4.7–5.5)
SODIUM SERPL-SCNC: 141 MMOL/L (ref 136–145)
WBC # BLD AUTO: 8.4 K/UL (ref 4.6–13.2)

## 2020-12-09 PROCEDURE — 85384 FIBRINOGEN ACTIVITY: CPT

## 2020-12-09 PROCEDURE — 65660000000 HC RM CCU STEPDOWN

## 2020-12-09 PROCEDURE — 80053 COMPREHEN METABOLIC PANEL: CPT

## 2020-12-09 PROCEDURE — 85027 COMPLETE CBC AUTOMATED: CPT

## 2020-12-09 PROCEDURE — 74011250637 HC RX REV CODE- 250/637: Performed by: FAMILY MEDICINE

## 2020-12-09 PROCEDURE — 85379 FIBRIN DEGRADATION QUANT: CPT

## 2020-12-09 PROCEDURE — 74011250636 HC RX REV CODE- 250/636: Performed by: FAMILY MEDICINE

## 2020-12-09 PROCEDURE — 97161 PT EVAL LOW COMPLEX 20 MIN: CPT

## 2020-12-09 PROCEDURE — 74011250637 HC RX REV CODE- 250/637: Performed by: HOSPITALIST

## 2020-12-09 RX ORDER — POLYETHYLENE GLYCOL 3350 17 G/17G
17 POWDER, FOR SOLUTION ORAL DAILY
Status: DISCONTINUED | OUTPATIENT
Start: 2020-12-09 | End: 2020-12-11 | Stop reason: HOSPADM

## 2020-12-09 RX ORDER — LISINOPRIL 5 MG/1
10 TABLET ORAL DAILY
Status: DISCONTINUED | OUTPATIENT
Start: 2020-12-09 | End: 2020-12-10

## 2020-12-09 RX ADMIN — Medication 6 TABLET: at 09:05

## 2020-12-09 RX ADMIN — Medication 10 MG: at 22:44

## 2020-12-09 RX ADMIN — Medication 500 MG: at 22:44

## 2020-12-09 RX ADMIN — FAMOTIDINE 20 MG: 20 TABLET, FILM COATED ORAL at 09:05

## 2020-12-09 RX ADMIN — Medication 10 ML: at 13:08

## 2020-12-09 RX ADMIN — LISINOPRIL 10 MG: 5 TABLET ORAL at 13:07

## 2020-12-09 RX ADMIN — Medication 500 MG: at 09:05

## 2020-12-09 RX ADMIN — Medication 10 ML: at 22:45

## 2020-12-09 RX ADMIN — DEXAMETHASONE 6 MG: 4 TABLET ORAL at 09:05

## 2020-12-09 RX ADMIN — FAMOTIDINE 20 MG: 20 TABLET, FILM COATED ORAL at 22:44

## 2020-12-09 RX ADMIN — Medication 1 CAPSULE: at 22:44

## 2020-12-09 RX ADMIN — Medication 1 CAPSULE: at 09:05

## 2020-12-09 RX ADMIN — Medication 10 ML: at 06:13

## 2020-12-09 NOTE — PROGRESS NOTES
Problem: Falls - Risk of  Goal: *Absence of Falls  Description: Document Yamileth Rodrigez Fall Risk and appropriate interventions in the flowsheet.   Outcome: Progressing Towards Goal  Note: Fall Risk Interventions:            Medication Interventions: Teach patient to arise slowly

## 2020-12-09 NOTE — PROGRESS NOTES
Problem: Patient Education: Go to Patient Education Activity  Goal: Patient/Family Education  Outcome: Progressing Towards Goal     Problem: Airway Clearance - Ineffective  Goal: *Patent airway  Outcome: Progressing Towards Goal  Goal: *Absence of airway secretions  Outcome: Progressing Towards Goal  Goal: *Able to cough effectively  Outcome: Progressing Towards Goal  Goal: *PALLIATIVE CARE:  Alleviation of secretions, cough and/or nasal congestion  Outcome: Progressing Towards Goal

## 2020-12-09 NOTE — ROUTINE PROCESS
Bedside and Verbal shift change report given to Beau Sadler H.RN (oncoming nurse) by Michelle Alexander (offgoing nurse). Report included the following information SBAR, Kardex, Procedure Summary, Intake/Output, MAR and Recent Results.

## 2020-12-09 NOTE — PROGRESS NOTES
0740: Assumed patient care from off going nurse Keren Hall RN     9835: Offered patient Miralax, patient stated he did not need it anymore he had two bms recently today. Shift summary: patient had an uneventful shift.  Rested quietly in room with no c/o

## 2020-12-09 NOTE — PROGRESS NOTES
Hospitalist Progress Note    Patient: Marizol Hoover MRN: [id number]  CSN: [id number]    YOB: 1940  Age: [de-identified] y.o. Sex: male    DOA: 12/4/2020 LOS:  LOS: 4 days          Chief Complaint:    covid pneumonia      Assessment/Plan   [de-identified] y/o healthy male with HTN admitted with mild hypoxemia due to COVID infection. His O2 saturation in the ED was 92% but was 96-97% at the time hospitalist admission.      Hypoxemia due to COVID infection-resolved  Continuing steroids, vitamin cocktail  He will require PT consult for d/c planning     Thrombocytopenia   Improved  HIV negative  Hold lovenox, D dimer down, duplex of legs pending    Hypertension   Uncontrolled  Add lisinopril     Full code     SCD DVT prophylaxis    Overall improved and looks ready to d/c soon, will ask PT to see  Repeat labs in am for stability samy on platelets  Home health for d/c    Disposition :  Patient Active Problem List   Diagnosis Code    Cough R05    Body aches R52    Suspected COVID-19 virus infection Z20.828    Hypoxemia R09.02    COVID-19 U07.1    Hypertension I10       Subjective:  I am doing ok  Eating fine  No N/V/D  No inc SOB  Not wearing 02, talking easily on phone whe I entered room      Review of systems:    Constitutional: denies fevers, chills, myalgias    Gastrointestinal: denies  diarrhea      Vital signs/Intake and Output:  Visit Vitals  BP (!) 172/79 (BP 1 Location: Left arm, BP Patient Position: At rest)   Pulse (!) 56   Temp 97.4 °F (36.3 °C)   Resp 16   Ht 5' 7\" (1.702 m)   Wt 84.7 kg (186 lb 12.8 oz)   SpO2 98%   BMI 29.26 kg/m²     Current Shift:  No intake/output data recorded.   Last three shifts:  12/07 1901 - 12/09 0700  In: 80 [P.O.:580]  Out: -     Exam:    General: Well developed, alert, NAD, OX3  CVS:Regular rate and rhythm, no M/R/G, S1/S2 heard, no thrill  Lungs:Clear to auscultation bilaterally, no wheezes, rhonchi, or rales  Abdomen: Soft, Nontender, No distention, Normal Bowel sounds, No hepatomegaly  Extremities: No C/C/E, pulses palpable 2+  Neuro:grossly normal , follows commands  Psych:appropriate                Labs: Results:       Chemistry Recent Labs     12/09/20  0000 12/08/20  0540 12/07/20  0420   * 96 95    141 140   K 4.2 4.1 4.2    108 108   CO2 26 27 27   BUN 23* 22* 20*   CREA 0.90 0.94 0.96   CA 8.3* 8.7 8.4*   AGAP 5 6 5   BUCR 26* 23* 21*   AP 60 62 65   TP 6.3* 6.8 6.7   ALB 2.5* 2.7* 2.6*   GLOB 3.8 4.1* 4.1*   AGRAT 0.7* 0.7* 0.6*      CBC w/Diff Recent Labs     12/09/20  0000 12/08/20  0540 12/07/20  0420   WBC 8.4 7.9 5.6   RBC 4.43* 4.76 4.63*   HGB 13.6 14.8 14.3   HCT 39.3 42.3 41.2   PLT 77* 58* 41*      Cardiac Enzymes No results for input(s): CPK, CKND1, FRANCIA in the last 72 hours. No lab exists for component: CKRMB, TROIP   Coagulation No results for input(s): PTP, INR, APTT, INREXT in the last 72 hours. Lipid Panel No results found for: CHOL, CHOLPOCT, CHOLX, CHLST, CHOLV, 366023, HDL, HDLP, LDL, LDLC, DLDLP, 330422, VLDLC, VLDL, TGLX, TRIGL, TRIGP, TGLPOCT, CHHD, CHHDX   BNP No results for input(s): BNPP in the last 72 hours.    Liver Enzymes Recent Labs     12/09/20  0000   TP 6.3*   ALB 2.5*   AP 60      Thyroid Studies No results found for: T4, T3U, TSH, TSHEXT     Procedures/imaging: see electronic medical records for all procedures/Xrays and details which were not copied into this note but were reviewed prior to creation of Steve Lowery MD

## 2020-12-09 NOTE — PROGRESS NOTES
Noted that PT eval has been ordered; will contact patient for [facility] [place] for [facility] recommendation.

## 2020-12-09 NOTE — PROGRESS NOTES
2030- Assessment complete according to flowsheets. Pt denies sob/cp/numbness/tingling at this time. Pt is A/O x4, lungs clear but diminished. Pt on room air at this time. 0000- 0400 labs drawn, submitted for shanice am.    Pt left in the room no signs of distress. Pt had an uneventful night. Pt should have gone home after two nights according to notes, DC TBD. Bedside and Verbal shift change report given to PRANAV Mora (oncoming nurse) by Anamaria Blank RN (offgoing nurse). Report included the following information SBAR, Kardex, Intake/Output, MAR and Recent Results.

## 2020-12-10 ENCOUNTER — APPOINTMENT (OUTPATIENT)
Dept: VASCULAR SURGERY | Age: 80
DRG: 177 | End: 2020-12-10
Attending: FAMILY MEDICINE
Payer: MEDICARE

## 2020-12-10 PROBLEM — I82.431 ACUTE DEEP VEIN THROMBOSIS (DVT) OF POPLITEAL VEIN OF RIGHT LOWER EXTREMITY (HCC): Status: ACTIVE | Noted: 2020-12-10

## 2020-12-10 LAB
ALBUMIN SERPL-MCNC: 2.7 G/DL (ref 3.4–5)
ALBUMIN/GLOB SERPL: 0.7 {RATIO} (ref 0.8–1.7)
ALP SERPL-CCNC: 60 U/L (ref 45–117)
ALT SERPL-CCNC: 23 U/L (ref 16–61)
ANION GAP SERPL CALC-SCNC: 5 MMOL/L (ref 3–18)
AST SERPL-CCNC: 19 U/L (ref 10–38)
ATRIAL RATE: 53 BPM
BILIRUB SERPL-MCNC: 0.3 MG/DL (ref 0.2–1)
BUN SERPL-MCNC: 24 MG/DL (ref 7–18)
BUN/CREAT SERPL: 29 (ref 12–20)
CALCIUM SERPL-MCNC: 9 MG/DL (ref 8.5–10.1)
CALCULATED P AXIS, ECG09: 58 DEGREES
CALCULATED R AXIS, ECG10: -62 DEGREES
CALCULATED T AXIS, ECG11: 0 DEGREES
CHLORIDE SERPL-SCNC: 108 MMOL/L (ref 100–111)
CO2 SERPL-SCNC: 27 MMOL/L (ref 21–32)
CREAT SERPL-MCNC: 0.84 MG/DL (ref 0.6–1.3)
DIAGNOSIS, 93000: NORMAL
ERYTHROCYTE [DISTWIDTH] IN BLOOD BY AUTOMATED COUNT: 14.7 % (ref 11.6–14.5)
GLOBULIN SER CALC-MCNC: 3.9 G/DL (ref 2–4)
GLUCOSE SERPL-MCNC: 96 MG/DL (ref 74–99)
HCT VFR BLD AUTO: 40.9 % (ref 36–48)
HGB BLD-MCNC: 14.3 G/DL (ref 13–16)
MCH RBC QN AUTO: 31 PG (ref 24–34)
MCHC RBC AUTO-ENTMCNC: 35 G/DL (ref 31–37)
MCV RBC AUTO: 88.7 FL (ref 74–97)
P-R INTERVAL, ECG05: 198 MS
PLATELET # BLD AUTO: 124 K/UL (ref 135–420)
PLATELET # BLD AUTO: 182 K/UL (ref 135–420)
PMV BLD AUTO: 12.6 FL (ref 9.2–11.8)
POTASSIUM SERPL-SCNC: 4.1 MMOL/L (ref 3.5–5.5)
PROT SERPL-MCNC: 6.6 G/DL (ref 6.4–8.2)
Q-T INTERVAL, ECG07: 440 MS
QRS DURATION, ECG06: 162 MS
QTC CALCULATION (BEZET), ECG08: 412 MS
RBC # BLD AUTO: 4.61 M/UL (ref 4.7–5.5)
SODIUM SERPL-SCNC: 140 MMOL/L (ref 136–145)
VENTRICULAR RATE, ECG03: 53 BPM
WBC # BLD AUTO: 11.9 K/UL (ref 4.6–13.2)

## 2020-12-10 PROCEDURE — 74011250637 HC RX REV CODE- 250/637: Performed by: FAMILY MEDICINE

## 2020-12-10 PROCEDURE — 74011250637 HC RX REV CODE- 250/637: Performed by: HOSPITALIST

## 2020-12-10 PROCEDURE — 85027 COMPLETE CBC AUTOMATED: CPT

## 2020-12-10 PROCEDURE — 93005 ELECTROCARDIOGRAM TRACING: CPT

## 2020-12-10 PROCEDURE — 36415 COLL VENOUS BLD VENIPUNCTURE: CPT

## 2020-12-10 PROCEDURE — 93970 EXTREMITY STUDY: CPT

## 2020-12-10 PROCEDURE — 74011250636 HC RX REV CODE- 250/636: Performed by: FAMILY MEDICINE

## 2020-12-10 PROCEDURE — 65660000000 HC RM CCU STEPDOWN

## 2020-12-10 PROCEDURE — 80053 COMPREHEN METABOLIC PANEL: CPT

## 2020-12-10 PROCEDURE — 85049 AUTOMATED PLATELET COUNT: CPT

## 2020-12-10 RX ORDER — LISINOPRIL 20 MG/1
20 TABLET ORAL DAILY
Status: DISCONTINUED | OUTPATIENT
Start: 2020-12-10 | End: 2020-12-11 | Stop reason: HOSPADM

## 2020-12-10 RX ADMIN — Medication 1 CAPSULE: at 21:02

## 2020-12-10 RX ADMIN — DEXAMETHASONE 6 MG: 4 TABLET ORAL at 11:57

## 2020-12-10 RX ADMIN — FAMOTIDINE 20 MG: 20 TABLET, FILM COATED ORAL at 11:57

## 2020-12-10 RX ADMIN — RIVAROXABAN 15 MG: 15 TABLET, FILM COATED ORAL at 18:08

## 2020-12-10 RX ADMIN — Medication 10 ML: at 06:54

## 2020-12-10 RX ADMIN — LISINOPRIL 20 MG: 20 TABLET ORAL at 11:57

## 2020-12-10 RX ADMIN — Medication 10 ML: at 21:03

## 2020-12-10 RX ADMIN — Medication 500 MG: at 21:02

## 2020-12-10 RX ADMIN — RIVAROXABAN 15 MG: 15 TABLET, FILM COATED ORAL at 11:57

## 2020-12-10 RX ADMIN — Medication 10 ML: at 14:00

## 2020-12-10 RX ADMIN — Medication 500 MG: at 11:57

## 2020-12-10 RX ADMIN — Medication 10 MG: at 21:02

## 2020-12-10 RX ADMIN — Medication 1 CAPSULE: at 11:57

## 2020-12-10 RX ADMIN — FAMOTIDINE 20 MG: 20 TABLET, FILM COATED ORAL at 21:02

## 2020-12-10 NOTE — PROGRESS NOTES
Hospitalist Progress Note    Patient: Olu Muro MRN: [id number]  CSN: [id number]    YOB: 1940  Age: [de-identified] y.o. Sex: male    DOA: 12/4/2020 LOS:  LOS: 5 days          Chief Complaint:    covid pneumonia      Assessment/Plan     [de-identified] y/o healthy male with HTN admitted with mild hypoxemia due to COVID infection. His O2 saturation in the ED was 92% but was 96-97% at the time hospitalist admission.      Hypoxemia due to COVID infection-resolved  Continuing steroids, vitamin cocktail  No resp complaints     Thrombocytopenia  May have been due to acute covid infection as recovering now-plt 124K  Improved  HIV negative    Acute DVT popliteal vein, right side-diagnosed this am  Start xarelto  Monitor platelet count, repeat CBC in am     Hypertension   Uncontrolled  Increase lisinopril dose    If platelet count stable, and he is doing well the d/c tomorrow with xarelto and close follow up       Disposition :  Patient Active Problem List   Diagnosis Code    Cough R05    Body aches R52    Suspected COVID-19 virus infection Z20.828    Hypoxemia R09.02    COVID-19 U07.1    Hypertension I10    Acute deep vein thrombosis (DVT) of popliteal vein of right lower extremity (HCC) I82.431       Subjective:        Review of systems:    Constitutional: denies fevers, chills, myalgias  Respiratory: denies SOB, cough  Cardiovascular: denies chest pain, palpitations  Gastrointestinal: denies nausea, vomiting, diarrhea      Vital signs/Intake and Output:  Visit Vitals  BP (!) 157/67   Pulse (!) 57   Temp 98 °F (36.7 °C)   Resp 18   Ht 5' 7\" (1.702 m)   Wt 84.7 kg (186 lb 12.8 oz)   SpO2 99%   BMI 29.26 kg/m²     Current Shift:  No intake/output data recorded.   Last three shifts:  12/08 1901 - 12/10 0700  In: 340 [P.O.:340]  Out: -     Exam:    General: Well developed, alert, NAD, OX3  Head/Neck: NCAT, supple, No masses, No lymphadenopathy  CVS:Regular rate and rhythm, no M/R/G, S1/S2 heard, no thrill  Lungs:Clear to auscultation bilaterally, no wheezes, rhonchi, or rales  Abdomen: Soft, Nontender, No distention, Normal Bowel sounds, No hepatomegaly  Extremities: No C/C/E, pulses palpable 2+  Skin:normal texture and turgor, no rashes, no lesions  Neuro:grossly normal , follows commands  Psych:appropriate                Labs: Results:       Chemistry Recent Labs     12/10/20  0450 12/09/20  0000 12/08/20  0540   GLU 96 108* 96    141 141   K 4.1 4.2 4.1    110 108   CO2 27 26 27   BUN 24* 23* 22*   CREA 0.84 0.90 0.94   CA 9.0 8.3* 8.7   AGAP 5 5 6   BUCR 29* 26* 23*   AP 60 60 62   TP 6.6 6.3* 6.8   ALB 2.7* 2.5* 2.7*   GLOB 3.9 3.8 4.1*   AGRAT 0.7* 0.7* 0.7*      CBC w/Diff Recent Labs     12/10/20  0450 12/09/20  0000 12/08/20  0540   WBC 11.9 8.4 7.9   RBC 4.61* 4.43* 4.76   HGB 14.3 13.6 14.8   HCT 40.9 39.3 42.3   * 77* 58*      Cardiac Enzymes No results for input(s): CPK, CKND1, FRANCIA in the last 72 hours. No lab exists for component: CKRMB, TROIP   Coagulation No results for input(s): PTP, INR, APTT, INREXT, INREXT in the last 72 hours. Lipid Panel No results found for: CHOL, CHOLPOCT, CHOLX, CHLST, CHOLV, 394310, HDL, HDLP, LDL, LDLC, DLDLP, 990303, VLDLC, VLDL, TGLX, TRIGL, TRIGP, TGLPOCT, CHHD, CHHDX   BNP No results for input(s): BNPP in the last 72 hours.    Liver Enzymes Recent Labs     12/10/20  0450   TP 6.6   ALB 2.7*   AP 60      Thyroid Studies No results found for: T4, T3U, TSH, TSHEXT, TSHEXT     Procedures/imaging: see electronic medical records for all procedures/Xrays and details which were not copied into this note but were reviewed prior to creation of Sweta Reyez MD

## 2020-12-10 NOTE — PROGRESS NOTES
Comprehensive Nutrition Assessment    Type and Reason for Visit: (P) Initial, RD nutrition re-screen/LOS    Nutrition Recommendations/Plan: Other: continue w/ POC  Nutrition Assessment:  (P) pt admitted w/ thrombocytopenia, HTN, hypoxemia, COVID 19        Estimated Daily Nutrient Needs:  Energy (kcal): (P) 2359; Weight Used for Energy Requirements: (P) Current  Protein (g): (P) 127; Weight Used for Protein Requirements: (P) Current(1.5)  Fluid (ml/day): (P) 2359; Method Used for Fluid Requirements: (P) 1 ml/kcal      Nutrition Related Findings:  (P) eating fine per MD note, +BM 12/9/20 Meds: zinc, miralax, zofran, melatonin, pepcid, vit d3, vit c; labs reviewed      Wounds:    (P) None       Current Nutrition Therapies:  DIET REGULAR    Anthropometric Measures:  Height:  (P) 5' 7\" (170.2 cm)  Current Body Wt:  (P) 84.4 kg (186 lb)   Admission Body Wt:       Usual Body Wt:        Ideal Body Wt:  (P) 148 lbs:  (P) 125.7 %   Adjusted Body Weight:   ; Weight Adjustment for:     Adjusted BMI:       BMI Category:  (P) Overweight (BMI 25.0-29. 9)       Nutrition Diagnosis:   (P) Other (specify)(At risk for inadequate oral intake) related to (P) impaired respiratory function as evidenced by (P) (COVID 19 infection and Day 6 at [facility])    Nutrition Interventions:   Food and/or Nutrient Delivery: (P) Continue current diet  Nutrition Education and Counseling: (P) No recommendations at this time  Coordination of Nutrition Care: (P) Continue to monitor while inpatient, Interdisciplinary rounds    Goals:  (P) Encourage PO intake >50% at most meals in the next 5-7days       Nutrition Monitoring and Evaluation:   Behavioral-Environmental Outcomes:    Food/Nutrient Intake Outcomes: (P) Diet advancement/tolerance, Food and nutrient intake  Physical Signs/Symptoms Outcomes: (P) Biochemical data, Skin, Weight, GI status    Discharge Planning:    (P) Continue current diet     Electronically signed by Jayde Keenan on 12/10/2020 at 10:39 AM

## 2020-12-10 NOTE — PROGRESS NOTES
Problem: Airway Clearance - Ineffective  Goal: Achieve or maintain patent airway  Outcome: Progressing Towards Goal     Problem: Gas Exchange - Impaired  Goal: Absence of hypoxia  Outcome: Progressing Towards Goal  Goal: Promote optimal lung function  Outcome: Progressing Towards Goal     Problem: Breathing Pattern - Ineffective  Goal: Ability to achieve and maintain a regular respiratory rate  Outcome: Progressing Towards Goal     Problem:  Body Temperature -  Risk of, Imbalanced  Goal: Ability to maintain a body temperature within defined limits  Outcome: Progressing Towards Goal  Goal: Will regain or maintain usual level of consciousness  Outcome: Progressing Towards Goal  Goal: Complications related to the disease process, condition or treatment will be avoided or minimized  Outcome: Progressing Towards Goal     Problem: Isolation Precautions - Risk of Spread of Infection  Goal: Prevent transmission of infectious organism to others  Outcome: Progressing Towards Goal     Problem: Nutrition Deficits  Goal: Optimize nutrtional status  Outcome: Progressing Towards Goal     Problem: Risk for Fluid Volume Deficit  Goal: Maintain normal heart rhythm  Outcome: Progressing Towards Goal  Goal: Maintain absence of muscle cramping  Outcome: Progressing Towards Goal  Goal: Maintain normal serum potassium, sodium, calcium, phosphorus, and pH  Outcome: Progressing Towards Goal     Problem: Loneliness or Risk for Loneliness  Goal: Demonstrate positive use of time alone when socialization is not possible  Outcome: Progressing Towards Goal     Problem: Fatigue  Goal: Verbalize increase energy and improved vitality  Outcome: Progressing Towards Goal     Problem: Patient Education: Go to Patient Education Activity  Goal: Patient/Family Education  Outcome: Progressing Towards Goal     Problem: Risk for Spread of Infection  Goal: Prevent transmission of infectious organism to others  Description: Prevent the transmission of infectious organisms to other patients, staff members, and visitors. Outcome: Progressing Towards Goal     Problem: Patient Education:  Go to Education Activity  Goal: Patient/Family Education  Outcome: Progressing Towards Goal     Problem: Airway Clearance - Ineffective  Goal: *Patent airway  Outcome: Progressing Towards Goal  Goal: *Absence of airway secretions  Outcome: Progressing Towards Goal  Goal: *Able to cough effectively  Outcome: Progressing Towards Goal  Goal: *PALLIATIVE CARE:  Alleviation of secretions, cough and/or nasal congestion  Outcome: Progressing Towards Goal     Problem: Patient Education: Go to Patient Education Activity  Goal: Patient/Family Education  Outcome: Progressing Towards Goal     Problem: Falls - Risk of  Goal: *Absence of Falls  Description: Document Carlene Fall Risk and appropriate interventions in the flowsheet.   Outcome: Progressing Towards Goal  Note: Fall Risk Interventions:            Medication Interventions: Teach patient to arise slowly, Patient to call before getting OOB

## 2020-12-10 NOTE — PROGRESS NOTES
0720: Assumed patient care from off going nurse [address] summary: Patient had an uneventful shift. Was started on xarelto due to DVT in right Leg.  Patient had no c/o

## 2020-12-10 NOTE — PROGRESS NOTES
Problem: Mobility Impaired (Adult and Pediatric)  Goal: *Acute Goals and Plan of Care (Insert Text)  Description: No goals necessary at this time as pt demonstrates functional mobility with  independence. Outcome: Resolved/Met  PHYSICAL THERAPY EVALUATION & DISCHARGE    Patient: Olu Muro (85 y.o. male)  Date: 12/9/2020  Primary Diagnosis: COVID-19 [U07.1]  Precautions:     ASSESSMENT AND RECOMMENDATIONS:  Based on the objective data described below, the patient presents with ROM/motor performance [facility] and demonstrates functional mobility with independence. Pt amb 90ft in room w/o gt deviation and demonstrates simulated stair climbing w/o difficulty. Pt O2 sat 99% pre and 96% post activity. Pt left back in bed in NAD with all needs in reach. Nurse Elsy Palacios unavailable, therefore nurse Gretchen Rubin notified of pt status. Skilled physical therapy is not indicated at this time. Educated pt in activity pacing as needed post discharge and pt expressed understanding. Discharge Recommendations: None  Further Equipment Recommendations for Discharge: N/A      SUBJECTIVE:   Patient stated I feel just fine.     OBJECTIVE DATA SUMMARY:     Past Medical History:   Diagnosis Date    Hypertension    History reviewed. No pertinent surgical history. Barriers to Learning/Limitations: None  Compensate with: visual, verbal, tactile, kinesthetic cues/model  Prior Level of Function/Home Situation: independent mobility; works driving trucks  [address]: Apartment(2nd flr )  # Steps to Enter: 5  Rails to Enter: Yes  Hand Rails : Left  One/Two Story Residence: One story  Living Alone: Yes  Support Systems: Child(brian)  Patient Expects to be Discharged to[de-identified] Apartment  Current DME Used/Available at Home: None  Critical Behavior:  Neurologic State: Alert; Appropriate for age  Orientation Level: Oriented X4  Cognition: Follows commands  Safety/Judgement: Awareness of environment; Fall prevention  Psychosocial  Patient Behaviors: Calm; Cooperative  Purposeful Interaction: Yes  Pt Identified Daily Priority: Clinical issues (comment)  [facility] Process: Nurture loving kindness;Establish trust;Teaching/learning; Attend basic human needs  Caring Interventions: Reassure; Therapeutic modalities  Reassure: Therapeutic listening; Informing;Caring rounds  Therapeutic Modalities: Intentional therapeutic touch  Strength:    Strength: Within functional limits  Tone & Sensation:   Sensation: Intact-but reports h/o \"neuritis\" in fingertips  Range Of Motion:  AROM: Within functional limits  Functional Mobility:  Bed Mobility:  Rolling: Independent  Supine to Sit: Independent  Scooting: Independent  Transfers:  Sit to Stand: Independent  Stand to Sit: Independent  Bed to Chair: Independent  Balance:   Sitting: Intact  Standing: Intact  Ambulation/Gait Training:  Distance (ft): 96 Feet (ft)  Ambulation - Level of Assistance: Independent  Gait Description (WDL): Exceptions to WDL  Pain:  Pain Scale 1: Numeric (0 - 10)  Pain Intensity 1: 0  Activity Tolerance:   Good   Please refer to the flowsheet for vital signs taken during this treatment. After treatment:   []         Patient left in no apparent distress sitting up in chair  [x]         Patient left in no apparent distress in bed  [x]         Call bell left within reach  [x]         Nursing notified  []         Caregiver present  []         Bed alarm activated    COMMUNICATION/EDUCATION:   [x]         Fall prevention education was provided and the patient/caregiver indicated understanding. []         Patient/family have participated as able in goal setting and plan of care. []         Patient/family agree to work toward stated goals and plan of care. []         Patient understands intent and goals of therapy, but is neutral about his/her participation. []         Patient is unable to participate in goal setting and plan of care.     Thank you for this referral.  Noni Clark, PT   Time Calculation: 17 mins

## 2020-12-10 NOTE — PROGRESS NOTES
2019 Assessment  Completed. 0020 Reassessment completed. 2004 Reassessment completed. 0600 Monitor tech notified this nurse that the patient now has a bundle branch block. An ekg was obtained. MD notified. 0720 Bedside and Verbal shift change report given to COLLIN Armando RN (oncoming nurse) by Darling Miranda RN (offgoing nurse). Report included the following information SBAR, Kardex, Accordion, Recent Results and Cardiac Rhythm SR with BBB.

## 2020-12-10 NOTE — PROGRESS NOTES
Problem: Breathing Pattern - Ineffective  Goal: Ability to achieve and maintain a regular respiratory rate  Outcome: Progressing Towards Goal     Problem: Nutrition Deficits  Goal: Optimize nutrtional status  Outcome: Progressing Towards Goal

## 2020-12-11 VITALS
HEIGHT: 67 IN | DIASTOLIC BLOOD PRESSURE: 60 MMHG | RESPIRATION RATE: 16 BRPM | TEMPERATURE: 97.3 F | BODY MASS INDEX: 29.32 KG/M2 | OXYGEN SATURATION: 99 % | SYSTOLIC BLOOD PRESSURE: 142 MMHG | HEART RATE: 75 BPM | WEIGHT: 186.8 LBS

## 2020-12-11 LAB
ALBUMIN SERPL-MCNC: 2.6 G/DL (ref 3.4–5)
ALBUMIN/GLOB SERPL: 0.7 {RATIO} (ref 0.8–1.7)
ALP SERPL-CCNC: 60 U/L (ref 45–117)
ALT SERPL-CCNC: 24 U/L (ref 16–61)
ANION GAP SERPL CALC-SCNC: 7 MMOL/L (ref 3–18)
AST SERPL-CCNC: 18 U/L (ref 10–38)
BASOPHILS # BLD: 0 K/UL (ref 0–0.1)
BASOPHILS NFR BLD: 0 % (ref 0–2)
BILIRUB SERPL-MCNC: 0.2 MG/DL (ref 0.2–1)
BUN SERPL-MCNC: 26 MG/DL (ref 7–18)
BUN/CREAT SERPL: 28 (ref 12–20)
CALCIUM SERPL-MCNC: 8.7 MG/DL (ref 8.5–10.1)
CHLORIDE SERPL-SCNC: 107 MMOL/L (ref 100–111)
CO2 SERPL-SCNC: 26 MMOL/L (ref 21–32)
CREAT SERPL-MCNC: 0.94 MG/DL (ref 0.6–1.3)
DIFFERENTIAL METHOD BLD: ABNORMAL
EOSINOPHIL # BLD: 0 K/UL (ref 0–0.4)
EOSINOPHIL NFR BLD: 0 % (ref 0–5)
ERYTHROCYTE [DISTWIDTH] IN BLOOD BY AUTOMATED COUNT: 14.9 % (ref 11.6–14.5)
GLOBULIN SER CALC-MCNC: 3.9 G/DL (ref 2–4)
GLUCOSE SERPL-MCNC: 105 MG/DL (ref 74–99)
HCT VFR BLD AUTO: 41.4 % (ref 36–48)
HGB BLD-MCNC: 14.1 G/DL (ref 13–16)
LYMPHOCYTES # BLD: 1.9 K/UL (ref 0.9–3.6)
LYMPHOCYTES NFR BLD: 16 % (ref 21–52)
MCH RBC QN AUTO: 30.4 PG (ref 24–34)
MCHC RBC AUTO-ENTMCNC: 34.1 G/DL (ref 31–37)
MCV RBC AUTO: 89.2 FL (ref 74–97)
MONOCYTES # BLD: 1 K/UL (ref 0.05–1.2)
MONOCYTES NFR BLD: 8 % (ref 3–10)
NEUTS SEG # BLD: 9 K/UL (ref 1.8–8)
NEUTS SEG NFR BLD: 76 % (ref 40–73)
PLATELET # BLD AUTO: 134 K/UL (ref 135–420)
PMV BLD AUTO: 13 FL (ref 9.2–11.8)
POTASSIUM SERPL-SCNC: 4.3 MMOL/L (ref 3.5–5.5)
PROT SERPL-MCNC: 6.5 G/DL (ref 6.4–8.2)
RBC # BLD AUTO: 4.64 M/UL (ref 4.7–5.5)
RBC MORPH BLD: ABNORMAL
SODIUM SERPL-SCNC: 140 MMOL/L (ref 136–145)
WBC # BLD AUTO: 11.9 K/UL (ref 4.6–13.2)

## 2020-12-11 PROCEDURE — 74011250636 HC RX REV CODE- 250/636: Performed by: FAMILY MEDICINE

## 2020-12-11 PROCEDURE — 74011250637 HC RX REV CODE- 250/637: Performed by: FAMILY MEDICINE

## 2020-12-11 PROCEDURE — 85025 COMPLETE CBC W/AUTO DIFF WBC: CPT

## 2020-12-11 PROCEDURE — 74011250637 HC RX REV CODE- 250/637: Performed by: HOSPITALIST

## 2020-12-11 PROCEDURE — 80053 COMPREHEN METABOLIC PANEL: CPT

## 2020-12-11 RX ORDER — HYDRALAZINE HYDROCHLORIDE 25 MG/1
25 TABLET, FILM COATED ORAL ONCE
Status: COMPLETED | OUTPATIENT
Start: 2020-12-11 | End: 2020-12-11

## 2020-12-11 RX ORDER — LISINOPRIL 20 MG/1
20 TABLET ORAL DAILY
Qty: 30 TAB | Refills: 0 | Status: SHIPPED | OUTPATIENT
Start: 2020-12-11

## 2020-12-11 RX ORDER — DEXAMETHASONE 6 MG/1
6 TABLET ORAL DAILY
Qty: 2 TAB | Refills: 0 | Status: SHIPPED | OUTPATIENT
Start: 2020-12-11 | End: 2021-09-18 | Stop reason: ALTCHOICE

## 2020-12-11 RX ADMIN — DEXAMETHASONE 6 MG: 4 TABLET ORAL at 09:12

## 2020-12-11 RX ADMIN — HYDRALAZINE HYDROCHLORIDE 25 MG: 25 TABLET, FILM COATED ORAL at 09:12

## 2020-12-11 RX ADMIN — LISINOPRIL 20 MG: 20 TABLET ORAL at 09:12

## 2020-12-11 RX ADMIN — RIVAROXABAN 15 MG: 15 TABLET, FILM COATED ORAL at 09:13

## 2020-12-11 RX ADMIN — Medication 10 ML: at 09:13

## 2020-12-11 RX ADMIN — Medication 6 TABLET: at 09:11

## 2020-12-11 RX ADMIN — FAMOTIDINE 20 MG: 20 TABLET, FILM COATED ORAL at 09:11

## 2020-12-11 RX ADMIN — Medication 1 CAPSULE: at 09:12

## 2020-12-11 RX ADMIN — Medication 500 MG: at 09:11

## 2020-12-11 NOTE — ROUTINE PROCESS
Verbal shift change report given to Joseph Krabbe, RN (oncoming nurse) by Kristin Tang RN (offgoing nurse). Report included the following information SBAR and Kardex.

## 2020-12-11 NOTE — PROGRESS NOTES
0026-Resting quietly in bed. Stable. No complaints. Denies pain at this time. Call light and personal items within reach. White board updated. Assessment complete. 0149-Resting quietly in bed. Call light within reach. 0327-Resting in bed, eyes closed, respirations 18-20.    0426-Stable. No change from initial assessment. Denies pain at this time. Call light and personal items wihtin reach. 0630-Resting quietly in bed, stable. Shift Summary:  Resting quietly in bed at shift change report.

## 2020-12-11 NOTE — PROGRESS NOTES
1920 Assumed care from Judy Rape, [facility].    0456 Bedside and Verbal shift change report given to Nam Molina RN (oncoming nurse) by Bonita Easley RN   (offgoing nurse). Report included the following information SBAR, Kardex, ED Summary, Procedure Summary, Intake/Output, MAR, Recent Results and Med Rec Status.

## 2020-12-11 NOTE — PROGRESS NOTES
Problem: Isolation Precautions - Risk of Spread of Infection  Goal: Prevent transmission of infectious organism to others  Outcome: Progressing Towards Goal     Problem: Isolation Precautions - Risk of Spread of Infection  Goal: Prevent transmission of infectious organism to others  Outcome: Progressing Towards Goal     Problem: Risk for Fluid Volume Deficit  Goal: Maintain normal heart rhythm  Outcome: Progressing Towards Goal     Problem: Risk for Fluid Volume Deficit  Goal: Maintain normal heart rhythm  Outcome: Progressing Towards Goal     Problem: Falls - Risk of  Goal: *Absence of Falls  Description: Document Carlene Fall Risk and appropriate interventions in the flowsheet. Outcome: Progressing Towards Goal  Note: Fall Risk Interventions:            Medication Interventions: Teach patient to arise slowly                   Problem: Falls - Risk of  Goal: *Absence of Falls  Description: Document Allyson Mitchell Fall Risk and appropriate interventions in the flowsheet.   Outcome: Progressing Towards Goal  Note: Fall Risk Interventions:            Medication Interventions: Teach patient to arise slowly

## 2020-12-11 NOTE — ROUTINE PROCESS
Bedside and Verbal shift change report given to Alisha Jauregui (oncoming nurse) by Germania Marie RN (offgoing nurse). Report included the following information SBAR, Kardex, Procedure Summary, Intake/Output, MAR and Recent Results.

## 2020-12-11 NOTE — DISCHARGE INSTRUCTIONS
Patient Education        Learning About ACE Inhibitors  What are ACE inhibitors? ACE (angiotensin-converting enzyme) inhibitors are medicines that lower blood pressure. They stop the release of an enzyme. This enzyme makes your blood vessels smaller. Without it, your blood vessels relax and get bigger. This lowers your blood pressure. These medicines also increase how much water and salt go into your urine. This also lowers blood pressure. You may take this kind of medicine if you have high blood pressure. Or you may take it if you have heart problems, kidney problems, or diabetes. If you have coronary artery disease, this medicine can help prevent heart attacks and strokes. Examples  · benazepril (Lotensin)  · enalapril (Vasotec)  · lisinopril (Prinivil, Zestril)  · ramipril (Altace)  This is not a complete list.  Possible side effects  Side effects may include:  · A cough. · Low blood pressure. This can make you feel dizzy or weak. · Too much potassium in your body. · Swelling of your lips, tongue, or face. If the swelling is severe, you may need treatment right away. Severe swelling can make it hard to breathe, but this is rare. You may have other side effects or reactions not listed here. Check the information that comes with your medicine. What to know about taking this medicine  · ACE inhibitors can cause a dry cough. Talk to your doctor if you have a dry cough. You may need a different medicine. · These medicines can cause an allergic reaction. This can cause a little swelling. Or it can cause red bumps on your skin that hurt. In rare cases, the swelling may make it hard for you to breathe. · Do not take this medicine if you are pregnant or plan to become pregnant. · Take your medicines exactly as prescribed. Call your doctor if you think you are having a problem with your medicine. · Check with your doctor or pharmacist before you use any other medicines.  This includes over-the-counter medicines. Make sure your doctor knows all of the medicines, vitamins, herbal products, and supplements you take. Taking some medicines together can cause problems. · You may need regular blood tests. Where can you learn more? Go to http://www.gray.com/  Enter P050 in the search box to learn more about \"Learning About ACE Inhibitors. \"  Current as of: December 16, 2019               Content Version: 12.6  © 0354-3621 [employer]. Care instructions adapted under license by [employer] (which disclaims liability or warranty for this information). If you have questions about a medical condition or this instruction, always ask your healthcare professional. [place] [address] any warranty or liability for your use of this information. Patient Education        10 Things to Do When You Have COVID-19    Stay home. Don't go to school, work, or public areas. And don't use public transportation, ride-shares, or taxis unless you have no choice. Leave your home only if you need to get medical care. But call the doctor's office first so they know you're coming. And wear a cloth face cover. Ask before leaving isolation. Talk with your doctor or other health professional about when it will be safe for you to leave isolation. Wear a cloth face cover when you are around other people. It can help stop the spread of the virus when you cough or sneeze. Limit contact with people in your home. If possible, stay in a separate bedroom and use a separate bathroom. Avoid contact with pets and other animals. If possible, have a friend or family member care for them while you're sick. Cover your mouth and nose with a tissue when you cough or sneeze. Then throw the tissue in the trash right away. Wash your hands often, especially after you cough or sneeze. Use soap and water, and scrub for at least 20 seconds.  If soap and water aren't available, use an alcohol-based hand . Don't share personal household items. These include bedding, towels, cups and glasses, and eating utensils. Clean and disinfect your home every day. Use household  or disinfectant wipes or sprays. Take special care to clean things that you grab with your hands. These include doorknobs, remote controls, phones, and handles on your refrigerator and microwave. And don't forget countertops, tabletops, bathrooms, and computer keyboards. Take acetaminophen (Tylenol) to relieve fever and body aches. Read and follow all instructions on the label. Current as of: July 10, 2020               Content Version: 12.6  © 2006-2020 [employer]. Care instructions adapted under license by [employer] (which disclaims liability or warranty for this information). If you have questions about a medical condition or this instruction, always ask your healthcare professional. [place] [address] any warranty or liability for your use of this information. Patient Education        Katina. Jessica 27 After Treatment for COVID-19: Care Instructions  Overview     You are being sent home from the hospital after being treated for COVID-19. Being in the hospital can be hard, especially if you've been in the intensive care unit (ICU). Even though you're going home, you probably don't feel well yet. Healing from COVID-19 takes time. You may feel very tired for weeks or months afterward, especially if you were on a ventilator. It will take time to get back to your old level of activity. Some people may have long-lasting health problems. But most people can look forward to feeling a little better every day. If you were on a ventilator, your throat may be sore and your voice hoarse or raspy for a while. After leaving the hospital, some people have feelings of anxiety and depression. They may have nightmares.  Or in their mind they may relive events that happened in the hospital (flashbacks). You can always reach out to your doctor if you're having trouble with these symptoms. Your doctor will tell you if you need to isolate yourself at home, and when you can end isolation. Follow-up care is a key part of your treatment and safety. Be sure to make and go to all appointments, and call your doctor if you are having problems. It's also a good idea to know your test results and keep a list of the medicines you take. How can you care for yourself at home? · Get plenty of rest. It can help you feel better. · Be kind to yourself if it's taking longer than you expected to feel better. You've been through a stressful time. · Get up and walk around every hour or two while you're resting. Slowly increase your activity as you start to feel better. · Eat healthy foods. · Drink plenty of fluids. If you have kidney, heart, or liver disease and have to limit fluids, talk with your doctor before you increase the amount of fluids you drink. · Take acetaminophen (such as Tylenol) to reduce a fever. It may also help with muscle aches. Read and follow all instructions on the label. If you are in isolation after you get home  · Wear a cloth face cover when you are around other people. It can help stop the spread of the virus when you cough or sneeze. · Limit contact with people in your home. If possible, stay in a separate bedroom and use a separate bathroom. · If you have to leave home, avoid crowds and try to stay at least 6 feet away from other people. · Avoid contact with pets and other animals. · Cover your mouth and nose with a tissue when you cough or sneeze. Then throw it in the trash right away. · Wash your hands often, especially after you cough or sneeze. Use soap and water, and scrub for at least 20 seconds. If soap and water aren't available, use an alcohol-based hand . · Don't share personal household items.  These include bedding, towels, cups and glasses, and eating utensils. · [address] in the warmest water allowed for the fabric type, and dry it completely. It's okay to wash other people's laundry with yours. · Clean and disinfect your home every day. Use household  and disinfectant wipes or sprays. Take special care to clean things that you grab with your hands. These include doorknobs, remote controls, phones, and handles on your refrigerator and microwave. And don't forget countertops, tabletops, bathrooms, and computer keyboards. When should you call for help? Call 911 anytime you think you may need emergency care. For example, call if you have life-threatening symptoms, such as:    · You have severe trouble breathing. (You can't talk at all.)     · You have constant chest pain or pressure.     · You are severely dizzy or lightheaded.     · You are confused or can't think clearly.     · Your face and lips have a blue color.     · You passed out (lost consciousness) or are very hard to wake up. Call your doctor now or seek immediate medical care if:    · You have moderate trouble breathing. (You can't speak a full sentence.)     · You are coughing up blood (more than about 1 teaspoon).     · You have signs of low blood pressure. These include feeling lightheaded; being too weak to stand; and having cold, pale, clammy skin. Watch closely for changes in your health, and be sure to contact your doctor if:    · Your symptoms get worse.     · You are not getting better as expected.     · You have new or worse symptoms of anxiety, depression, nightmares, or flashbacks. Call before you go to the doctor's office. Follow their instructions. And wear a cloth face cover. Current as of: July 10, 2020               Content Version: 12.6  © 6447-6917 [employer]. Care instructions adapted under license by [employer] (which disclaims liability or warranty for this information).  If you have questions about a medical condition or this instruction, always ask your healthcare professional. [address] any warranty or liability for your use of this information. Patient Education        Deep Vein Thrombosis: Care Instructions  Overview     A deep vein thrombosis (DVT) is a blood clot in certain veins, usually in the legs, pelvis, or arms. Blood clots in these veins need to be treated because they can get bigger, break loose, and travel through the bloodstream to the lungs. A blood clot in a lung can be life-threatening. The doctor may have given you a blood thinner (anticoagulant). A blood thinner can stop the blood clot from growing larger and prevent new clots from forming. You will need to take a blood thinner for 3 to 6 months or longer. The doctor has checked you carefully, but problems can develop later. If you notice any problems or new symptoms, get medical treatment right away. Follow-up care is a key part of your treatment and safety. Be sure to make and go to all appointments, and call your doctor if you are having problems. It's also a good idea to know your test results and keep a list of the medicines you take. How can you care for yourself at home? · Take your medicines exactly as prescribed. Call your doctor if you think you are having a problem with your medicine. · If you are taking a blood thinner, be sure you get instructions about how to take your medicine safely. Blood thinners can cause serious bleeding problems. · Wear compression stockings if your doctor recommends them. These stockings are tighter at the feet than on the legs. They may reduce pain and swelling in your legs. But there are different types of stockings, and they need to fit right. So your doctor will recommend what you need. · When you sit, use a pillow to raise the arm or leg that has the blood clot. Try to keep it above the level of your heart. When should you call for help? Call 911 anytime you think you may need emergency care. For example, call if:    · You passed out (lost consciousness).     · You have symptoms of a blood clot in your lung (called a pulmonary embolism). These include:  ? Sudden chest pain. ? Trouble breathing. ? Coughing up blood. Call your doctor now or seek immediate medical care if:    · You have new or worse trouble breathing.     · You are dizzy or lightheaded, or you feel like you may faint.     · You have symptoms of a blood clot in your arm or leg. These may include:  ? Pain in the arm, calf, back of the knee, thigh, or groin. ? Redness and swelling in the arm, leg, or groin. Watch closely for changes in your health, and be sure to contact your doctor if:    · You do not get better as expected. Where can you learn more? Go to http://www.gray.com/  Enter A940 in the search box to learn more about \"Deep Vein Thrombosis: Care Instructions. \"  Current as of: March 4, 2020               Content Version: 12.6  © 8804-5440 [employer]. Care instructions adapted under license by [employer] (which disclaims liability or warranty for this information). If you have questions about a medical condition or this instruction, always ask your healthcare professional. [address] any warranty or liability for your use of this information. Patient Education        High Blood Pressure: Care Instructions  Overview     It's normal for blood pressure to go up and down throughout the day. But if it stays up, you have high blood pressure. Another name for high blood pressure is hypertension. Despite what a lot of people think, high blood pressure usually doesn't cause headaches or make you feel dizzy or lightheaded. It usually has no symptoms. But it does increase your risk of stroke, heart attack, and other problems.  You and your doctor will talk about your risks of these problems based on your blood pressure. Your doctor will give you a goal for your blood pressure. Your goal will be based on your health and your age. Lifestyle changes, such as eating healthy and being active, are always important to help lower blood pressure. You might also take medicine to reach your blood pressure goal.  Follow-up care is a key part of your treatment and safety. Be sure to make and go to all appointments, and call your doctor if you are having problems. It's also a good idea to know your test results and keep a list of the medicines you take. How can you care for yourself at home? Medical treatment  · If you stop taking your medicine, your blood pressure will go back up. You may take one or more types of medicine to lower your blood pressure. Be safe with medicines. Take your medicine exactly as prescribed. Call your doctor if you think you are having a problem with your medicine. · Talk to your doctor before you start taking aspirin every day. Aspirin can help certain people lower their risk of a heart attack or stroke. But taking aspirin isn't right for everyone, because it can cause serious bleeding. · See your doctor regularly. You may need to see the doctor more often at first or until your blood pressure comes down. · If you are taking blood pressure medicine, talk to your doctor before you take decongestants or anti-inflammatory medicine, such as ibuprofen. Some of these medicines can raise blood pressure. · Learn how to check your blood pressure at home. Lifestyle changes  · Stay at a healthy weight. This is especially important if you put on weight around the waist. Losing even 10 pounds can help you lower your blood pressure. · If your doctor recommends it, get more exercise. Walking is a good choice. Bit by bit, increase the amount you walk every day. Try for at least 30 minutes on most days of the week. You also may want to swim, bike, or do other activities. · Avoid or limit alcohol.  Talk to your doctor about whether you can drink any alcohol. · Try to limit how much sodium you eat to less than 2,300 milligrams (mg) a day. Your doctor may ask you to try to eat less than 1,500 mg a day. · Eat plenty of fruits (such as bananas and oranges), vegetables, legumes, whole grains, and low-fat dairy products. · Lower the amount of saturated fat in your diet. Saturated fat is found in animal products such as milk, cheese, and meat. Limiting these foods may help you lose weight and also lower your risk for heart disease. · Do not smoke. Smoking increases your risk for heart attack and stroke. If you need help quitting, talk to your doctor about stop-smoking programs and medicines. These can increase your chances of quitting for good. When should you call for help? Call  911 anytime you think you may need emergency care. This may mean having symptoms that suggest that your blood pressure is causing a serious heart or blood vessel problem. Your blood pressure may be over 180/120. For example, call 911 if:    · You have symptoms of a heart attack. These may include:  ? Chest pain or pressure, or a strange feeling in the chest.  ? Sweating. ? Shortness of breath. ? Nausea or vomiting. ? Pain, pressure, or a strange feeling in the back, neck, jaw, or upper belly or in one or both shoulders or arms. ? Lightheadedness or sudden weakness. ? A fast or irregular heartbeat.     · You have symptoms of a stroke. These may include:  ? Sudden numbness, tingling, weakness, or loss of movement in your face, arm, or leg, especially on only one side of your body. ? Sudden vision changes. ? Sudden trouble speaking. ? Sudden confusion or trouble understanding simple statements. ? Sudden problems with walking or balance. ? A sudden, severe headache that is different from past headaches.     · You have severe back or belly pain. Do not wait until your blood pressure comes down on its own.  Get help right away.  Call your doctor now or seek immediate care if:    · Your blood pressure is much higher than normal (such as 180/120 or higher), but you don't have symptoms.     · You think high blood pressure is causing symptoms, such as:  ? Severe headache.  ? Blurry vision. Watch closely for changes in your health, and be sure to contact your doctor if:    · Your blood pressure measures higher than your doctor recommends at least 2 times. That means the top number is higher or the bottom number is higher, or both.     · You think you may be having side effects from your blood pressure medicine. Where can you learn more? Go to http://www.gray.com/  Enter [id number] in the search box to learn more about \"High Blood Pressure: Care Instructions. \"  Current as of: December 16, 2019               Content Version: 12.6  © 2030-5502 [employer]. Care instructions adapted under license by [employer] (which disclaims liability or warranty for this information). If you have questions about a medical condition or this instruction, always ask your healthcare professional. [address] any warranty or liability for your use of this information. DISCHARGE SUMMARY from Nurse    PATIENT INSTRUCTIONS:      What to do at Home:  Recommended activity: Activity as tolerated. Please follow up with Primary care physician. *  Please give a list of your current medications to your Primary Care Provider. *  Please update this list whenever your medications are discontinued, doses are      changed, or new medications (including over-the-counter products) are added. *  Please carry medication information at all times in case of emergency situations.     These are general instructions for a healthy lifestyle:    No smoking/ No tobacco products/ Avoid exposure to second hand smoke  Surgeon General's Warning:  Quitting smoking now greatly reduces serious risk to your health. Obesity, smoking, and sedentary lifestyle greatly increases your risk for illness    A healthy diet, regular physical exercise & weight monitoring are important for maintaining a healthy lifestyle    You may be retaining fluid if you have a history of heart failure or if you experience any of the following symptoms:  Weight gain of 3 pounds or more overnight or 5 pounds in a week, increased swelling in our hands or feet or shortness of breath while lying flat in bed. Please call your doctor as soon as you notice any of these symptoms; do not wait until your next office visit. The discharge information has been reviewed with the patient. The patient verbalized understanding. Discharge medications reviewed with the patient and appropriate educational materials and side effects teaching were provided.     Patient armband removed and shredded    ___________________________________________________________________________________________________________________________________

## 2020-12-11 NOTE — DISCHARGE SUMMARY
[facility] SUMMARY    Name:  Brenna Huff  MR#:   [id number]  :  1940  ACCOUNT #:  [de-identified]  ADMIT DATE:  2020  DISCHARGE DATE:  2020      DIAGNOSES AT DISCHARGE:  1.  COVID pneumonia. 2.  Thrombocytopenia. 3.  Acute deep vein thrombosis of the right popliteal vein. 4.  Hypertension. HOSPITAL SUMMARY:  This 80-year-old gentleman who lives alone had a Thanksgiving celebration at his home with about eight family members and a week later developed cough, flu-like illness. He was tested positive for COVID in the emergency room two days prior to this admission and was given inhalers and steroids but ended up having more nausea and vomiting, dry cough, low-grade fever and was admitted to the hospital.  During his hospitalization, he was noted to become thrombocytopenic likely due to the acute viral infection. We did not have a recent baseline outside of 2017, but he started at 102,000 and ended up as low as 41,000. Nonetheless, he has regained his platelets. He is up to 134,000 at this point in time and intermittently his DVT prophylaxis was held due to the low platelets. He was improving very nicely. No real oxygen requirement during his hospital stay. He had an ultrasound of his legs done, and there was an acute thrombus present in the right popliteal vein, so he was started on Xarelto. We monitored his platelets for 24 hours. They were at 124,000 yesterday morning, then 182,000, now 134,000. I believe that is stable at this point to continue the Xarelto since his viral illness is markedly improved. D-dimer never peaked above 1.8, the last check was 0.82. His metabolic panel is within normal limits. His LFTs are within normal range. He is not a diabetic. Last white count is 11.9, H and H 14.1 and 41.4.     At this point, he is stable for discharge from the hospital.  He is actually sitting dressed, ready to go as I entered the room this morning as I had advised him he might be able to discharge today. He has denied shortness of breath, cough, and there has been no hypoxia the last couple of days that I have seen him in rounds. He has been doing well. His appetite has maintained. He denies anosmia, and he has had no nausea, vomiting, diarrhea, or abdominal pain. So, the Xarelto was begun yesterday for the acute DVT, and I have had discussions with him about monitoring for any signs or symptoms of bleeding on that blood thinner, and we will make sure to make arrangements for his followup as an outpatient. Since he did not have a definitive PCP, I asked the  to please make sure they had a followup for him as soon as possible within one week with a new primary care physician. So with that said, he is awake and alert today, in good spirits, sitting dressed in the room, no acute distress. Lungs clear. Cardiac exam unchanged. Abdomen benign. Lower extremities, no pitting edema, no palpable cords, no tenderness. He is to be discharged today on Xarelto 15 mg twice daily for 20 more days, then 20 mg daily thereafter that, and the patient was continued on Decadron 6 mg for two more days daily. I did add lisinopril for his blood pressure control here and recommend he continue that as an outpatient at 20 mg daily, and he can use his albuterol inhaler every four hours as needed if he so needs it at home, but I do not see any respiratory issues here at this point, so he will continue to finish the Decadron course started here, the lisinopril for his blood pressure, and Xarelto for the acute DVT in his right leg. I have given him explicit instructions on his medicines and followup today. Advised a low-sodium diet. No alcohol. He is discharging to home per his preference today. He has not required any assistance with ambulation. Forty minutes on discharge time.     Also note, I have reviewed his quarantine recommendations for at least another 10 days for him at home and advised what that means to him.       Ronen Suggs MD      [id number]  D:  12/11/2020 12:31  T:  12/11/2020 13:24  JOB #:  [id number]

## 2020-12-11 NOTE — PROGRESS NOTES
Discharge instructions and follow up appointment reviewed with patient. Prescriptions sent to preferred pharmacy. Patient verbalized understanding. Waiting on ride home.  Patient stated is daughter was \"acting like she don't want to pick me up\" because I am positive (referencing COVID)

## 2020-12-11 NOTE — PROGRESS NOTES
0730 Bedside and Verbal shift change report given to STERLING Smith (oncoming nurse) by Shanel Durant RN (offgoing nurse). Report included the following information SBAR, Kardex, Intake/Output, and MAR. Pt in bed, call light in reach. 0815 Bedside and Verbal shift change report given to Belen Vasquez RN (oncoming nurse) by Jennifer Maldonado RN (offgoing nurse). Report included the following information SBAR, Kardex, Intake/Output, and MAR. Pt in bed, call light in reach.

## 2020-12-11 NOTE — ROUTINE PROCESS
Bedside and Verbal shift change report given to Castillo Maciel RN by Marquis Hartmann RN for  Gordy Celis RN (offgoing nurse). Report included the following information SBAR and Kardex.

## 2020-12-13 ENCOUNTER — PATIENT OUTREACH (OUTPATIENT)
Dept: CASE MANAGEMENT | Age: 80
End: 2020-12-13

## 2020-12-13 NOTE — PROGRESS NOTES
Date/Time:  12/13/2020 12:34 PM   Call within 2 business days of discharge: Yes   Attempted to reach patient by telephone. Date/Time:  12/13/2020 12:35 PM   Call within 2 business days of discharge: Yes   Attempted to reach patient by telephone. Unable to leave HIPPA compliant message requesting a return call. Will attempt to reach patient again.

## 2020-12-14 ENCOUNTER — PATIENT OUTREACH (OUTPATIENT)
Dept: CASE MANAGEMENT | Age: 80
End: 2020-12-14

## 2020-12-14 LAB — PERIPHERAL SMEAR,PSM: NORMAL

## 2020-12-14 NOTE — PROGRESS NOTES
Date/Time:  12/14/2020 12:22 PM  Attempted to reach patient by telephone. Unable to contact patient. Patient resolved from Transition of Care episode on 12/14/2020      No further outreach scheduled with this CTN/ACM. Episode of Care resolved.

## 2020-12-16 ENCOUNTER — HOSPITAL ENCOUNTER (EMERGENCY)
Age: 80
Discharge: HOME | End: 2020-12-16
Attending: EMERGENCY MEDICINE
Payer: MEDICARE

## 2020-12-16 ENCOUNTER — APPOINTMENT (OUTPATIENT)
Dept: GENERAL RADIOLOGY | Age: 80
End: 2020-12-16
Attending: EMERGENCY MEDICINE
Payer: MEDICARE

## 2020-12-16 VITALS
WEIGHT: 175 LBS | OXYGEN SATURATION: 99 % | BODY MASS INDEX: 27.47 KG/M2 | DIASTOLIC BLOOD PRESSURE: 62 MMHG | HEIGHT: 67 IN | RESPIRATION RATE: 16 BRPM | TEMPERATURE: 97.3 F | SYSTOLIC BLOOD PRESSURE: 134 MMHG | HEART RATE: 77 BPM

## 2020-12-16 DIAGNOSIS — R14.0 ABDOMINAL BLOATING: Primary | ICD-10-CM

## 2020-12-16 DIAGNOSIS — K59.00 CONSTIPATION, UNSPECIFIED CONSTIPATION TYPE: ICD-10-CM

## 2020-12-16 PROCEDURE — 99282 EMERGENCY DEPT VISIT SF MDM: CPT

## 2020-12-16 PROCEDURE — 74018 RADEX ABDOMEN 1 VIEW: CPT

## 2020-12-16 RX ORDER — MAGNESIUM CITRATE
296 SOLUTION, ORAL ORAL
Qty: 1 BOTTLE | Refills: 0 | Status: SHIPPED | OUTPATIENT
Start: 2020-12-16 | End: 2020-12-16

## 2020-12-16 RX ORDER — SIMETHICONE 80 MG
80 TABLET,CHEWABLE ORAL
Qty: 20 TAB | Refills: 0 | Status: SHIPPED | OUTPATIENT
Start: 2020-12-16

## 2020-12-16 RX ORDER — DOCUSATE SODIUM 100 MG/1
100 CAPSULE, LIQUID FILLED ORAL 2 TIMES DAILY
Qty: 60 CAP | Refills: 0 | Status: SHIPPED | OUTPATIENT
Start: 2020-12-16 | End: 2021-01-15

## 2020-12-16 NOTE — ED TRIAGE NOTES
Patient reports he needs to have some relief from constipation denies pain just constipated for 2 days

## 2020-12-16 NOTE — ED PROVIDER NOTES
EMERGENCY DEPARTMENT HISTORY AND PHYSICAL EXAM    Date: 12/16/2020  Patient Name: Jame Hogan    History of Presenting Illness     Chief Complaint   Patient presents with    Constipation         History Provided By: Patient    Jame Hogan is a [de-identified] y.o. male who presents to the emergency department C/O of constipation, abdominal bloating. Patient states he has been having the symptoms for the last 2 days. He states he was recently discharged from our facility for Covid. States he does feel like he has improved somewhat but is still feeling slightly fatigued. He denies any fever, cough, nausea, vomiting. States he was not sure what medications to take over-the-counter for his constipation. Denies any abdominal pain but states that he feels like his stomach is more bloated than usual.. PCP: None    Current Outpatient Medications   Medication Sig Dispense Refill    simethicone (MYLICON) 80 mg chewable tablet Take 1 Tab by mouth every six (6) hours as needed for Flatulence. 20 Tab 0    docusate sodium (Colace) 100 mg capsule Take 1 Cap by mouth two (2) times a day for 30 days. 60 Cap 0    magnesium citrate solution Take 296 mL by mouth now for 1 dose. 1 Bottle 0    dexAMETHasone (DECADRON) 6 mg tablet Take 1 Tab by mouth daily. 2 Tab 0    lisinopriL (PRINIVIL, ZESTRIL) 20 mg tablet Take 1 Tab by mouth daily. 30 Tab 0    rivaroxaban (XARELTO) 15 mg tab tablet Take 1 Tab by mouth two (2) times daily (with meals) for 20 days. 40 Tab 0    rivaroxaban (Xarelto) 20 mg tab tablet Take 1 Tab by mouth daily (with breakfast). Start this prescription after he has finished 20 days of BID dosing at 15 mg 30 Tab 0    albuterol (PROVENTIL HFA, VENTOLIN HFA, PROAIR HFA) 90 mcg/actuation inhaler Take 1 Puff by inhalation every four (4) hours as needed for Wheezing.  1 Inhaler 0    albuterol (PROVENTIL HFA, VENTOLIN HFA, PROAIR HFA) 90 mcg/actuation inhaler Take 2 Puffs by inhalation every four (4) hours as needed for Wheezing or Shortness of Breath. 1 Inhaler 1       Past History     Past Medical History:  Past Medical History:   Diagnosis Date    COVID-19     Hypertension        Past Surgical History:  History reviewed. No pertinent surgical history. Family History:  Family History   Problem Relation Age of Onset    No Known Problems Mother     No Known Problems Father        Social History:  Social History     Tobacco Use    Smoking status: Never Smoker    Smokeless tobacco: Never Used   Substance Use Topics    Alcohol use: No    Drug use: Never       Allergies:  No Known Allergies      Review of Systems   Review of Systems   Constitutional: Negative for fever. Respiratory: Negative for chest tightness and shortness of breath. Cardiovascular: Negative for chest pain. Gastrointestinal: Positive for abdominal distention and constipation. Negative for abdominal pain. Genitourinary: Negative for flank pain. All other systems reviewed and are negative. All other systems reviewed and are negative.     Physical Exam     Vitals:    12/16/20 1048 12/16/20 1126   BP: 134/62    Pulse: 77    Resp: 16    Temp: 97.3 °F (36.3 °C)    SpO2: 99% 99%   Weight: 79.4 kg (175 lb)    Height: 5' 7\" (1.702 m)      Physical Exam    Nursing notes and vital signs reviewed    Airway: intact, speaking normally  Breathing: No apparent distress, no cyanosis  Circulation: Peripheral pulses equal    Constitutional: Non toxic appearing, no acute distress, appearing stated age  [de-identified]:  Head: Normocephalic, Atraumatic  Eyes: PERRL, EOMI, No conjunctival injection  Ears: external ears normal  Nose: No rhinorrhea, external nose normal  Throat: mucous membranes moist  Neck: symmetric, trachea midline, no obvious swelling, no JVD  Cardiovascular: Regular rate and rhythm, no murmurs  Lungs: Clear to ausculation bilaterally, No stridor, Normal work of breathing and chest excursion bilaterally  Abdomen: Soft, non tender, mild abdominal distention with hyperactive bowel sounds, No rigidity, no peritoneal signs  Musculoskeletal:  No evidence of obvious deformity to the back, neck or extremities, no LE edema  Skin: Warm, dry, No obvious rashes  Neuro: Alert and oriented x 3, CN 2-12 intact, normal speech, strength and sensation full and symmetric bilaterally  Psychiatric: Normal mood and affect      Diagnostic Study Results     Labs -   No results found for this or any previous visit (from the past 72 hour(s)). Radiologic Studies -   XR ABD (KUB)   Final Result   IMPRESSION:      Limited study as above. Dilated loop of small bowel in the left abdomen. If clinical concern for bowel   obstruction recommend further evaluation with CT abdomen pelvis. CT Results  (Last 48 hours)    None        CXR Results  (Last 48 hours)    None          Medications given in the ED-  Medications - No data to display      Medical Decision Making     I reviewed the vital signs, available nursing notes, past medical history, past surgical history, family history and social history. Vital Signs interpretation- I have reviewed the patient's vital signs. Pulse Oximetry interpretation - 100% on Room air     Cardiac Monitor interpretation:  Rate: 77 bpm  Rhythm: sinus    Records Reviewed: Nursing Notes and Old Medical Records    Procedures:  Procedures    ED Course & MDM:   We will obtain upright x-ray of the abdomen. X-ray shows mostly nonspecific bowel gas pattern, there is one area in the left abdomen showing some dilated small bowel but no air-fluid levels. I did discuss this with the patient. I recommended a trial of magnesium citrate, simethicone and Colace. I recommended he come back to emergency department if symptoms worsen including but not limited to worsening abdominal pain, inability to pass stool or gas as well as nausea and vomiting.   He understands and agrees to plan    Diagnosis and Disposition         DISCHARGE NOTE:    Zachary Peterson Keisha's  results have been reviewed with him. He has been counseled regarding his diagnosis, treatment, and plan. He verbally conveys understanding and agreement of the signs, symptoms, diagnosis, treatment and prognosis and additionally agrees to follow up as discussed. He also agrees with the care-plan and conveys that all of his questions have been answered. I have also provided discharge instructions for him that include: educational information regarding their diagnosis and treatment, and list of reasons why they would want to return to the ED prior to their follow-up appointment, should his condition change. He has been provided with education for proper emergency department utilization. CLINICAL IMPRESSION:    1. Abdominal bloating    2. Constipation, unspecified constipation type        PLAN:  1. D/C Home  2. Current Discharge Medication List      START taking these medications    Details   simethicone (MYLICON) 80 mg chewable tablet Take 1 Tab by mouth every six (6) hours as needed for Flatulence. Qty: 20 Tab, Refills: 0      docusate sodium (Colace) 100 mg capsule Take 1 Cap by mouth two (2) times a day for 30 days. Qty: 60 Cap, Refills: 0      magnesium citrate solution Take 296 mL by mouth now for 1 dose. Qty: 1 Bottle, Refills: 0           3. Follow-up Information     Follow up With Specialties Details Why Contact Marcia Robles,  Internal Medicine Schedule an appointment as soon as possible for a visit  for primary care physician [address] [place] [address]  [phone]          _______________________________      Please note that this dictation was completed with [employer], the computer voice recognition software. Quite often unanticipated grammatical, syntax, homophones, and other interpretive errors are inadvertently transcribed by the computer software. Please disregard these errors. Please excuse any errors that have escaped final proofreading.

## 2020-12-16 NOTE — DISCHARGE INSTRUCTIONS
Take simethicone for gas  Take magnesium citrate for laxative for constipation  Take Colace for stool softener

## 2021-09-18 ENCOUNTER — HOSPITAL ENCOUNTER (EMERGENCY)
Age: 81
Discharge: HOME | End: 2021-09-18
Attending: EMERGENCY MEDICINE
Payer: MEDICARE

## 2021-09-18 ENCOUNTER — APPOINTMENT (OUTPATIENT)
Dept: GENERAL RADIOLOGY | Age: 81
End: 2021-09-18
Attending: EMERGENCY MEDICINE
Payer: MEDICARE

## 2021-09-18 VITALS
TEMPERATURE: 97.4 F | WEIGHT: 184 LBS | OXYGEN SATURATION: 98 % | BODY MASS INDEX: 28.88 KG/M2 | SYSTOLIC BLOOD PRESSURE: 147 MMHG | HEART RATE: 84 BPM | HEIGHT: 67 IN | DIASTOLIC BLOOD PRESSURE: 88 MMHG | RESPIRATION RATE: 20 BRPM

## 2021-09-18 DIAGNOSIS — R60.0 HAND EDEMA: Primary | ICD-10-CM

## 2021-09-18 PROCEDURE — 74011250637 HC RX REV CODE- 250/637: Performed by: EMERGENCY MEDICINE

## 2021-09-18 PROCEDURE — 74011636637 HC RX REV CODE- 636/637: Performed by: EMERGENCY MEDICINE

## 2021-09-18 PROCEDURE — 73130 X-RAY EXAM OF HAND: CPT

## 2021-09-18 PROCEDURE — 99283 EMERGENCY DEPT VISIT LOW MDM: CPT

## 2021-09-18 RX ORDER — HYDROCODONE BITARTRATE AND ACETAMINOPHEN 5; 325 MG/1; MG/1
1 TABLET ORAL
Qty: 12 TABLET | Refills: 0 | Status: SHIPPED | OUTPATIENT
Start: 2021-09-18 | End: 2021-09-21

## 2021-09-18 RX ORDER — CLINDAMYCIN HYDROCHLORIDE 300 MG/1
300 CAPSULE ORAL 4 TIMES DAILY
Qty: 40 CAPSULE | Refills: 0 | Status: SHIPPED | OUTPATIENT
Start: 2021-09-18 | End: 2021-09-28

## 2021-09-18 RX ORDER — HYDROCODONE BITARTRATE AND ACETAMINOPHEN 5; 325 MG/1; MG/1
1 TABLET ORAL
Status: COMPLETED | OUTPATIENT
Start: 2021-09-18 | End: 2021-09-18

## 2021-09-18 RX ORDER — CLINDAMYCIN HYDROCHLORIDE 150 MG/1
300 CAPSULE ORAL
Status: COMPLETED | OUTPATIENT
Start: 2021-09-18 | End: 2021-09-18

## 2021-09-18 RX ORDER — PREDNISONE 20 MG/1
60 TABLET ORAL DAILY
Qty: 15 TABLET | Refills: 0 | Status: SHIPPED | OUTPATIENT
Start: 2021-09-18 | End: 2021-09-23

## 2021-09-18 RX ADMIN — HYDROCODONE BITARTRATE AND ACETAMINOPHEN 1 TABLET: 5; 325 TABLET ORAL at 04:14

## 2021-09-18 RX ADMIN — CLINDAMYCIN HYDROCHLORIDE 300 MG: 150 CAPSULE ORAL at 04:14

## 2021-09-18 RX ADMIN — PREDNISONE 50 MG: 20 TABLET ORAL at 04:14

## 2021-09-18 NOTE — ED TRIAGE NOTES
Patient ambulatory to triage with c/o right hand pain and swelling x2 days. Denies injury. Lump noted to top of right hand. Patient reports possible spider bite. Alert and oriented.

## 2021-09-20 NOTE — ED PROVIDER NOTES
EMERGENCY DEPARTMENT HISTORY AND PHYSICAL EXAM    Date: 9/18/2021  Patient Name: Cherelle Beth    History of Presenting Illness     Chief Complaint   Patient presents with    Skin Problem         History Provided By: Patient    Additional History (Context):   4:01 AM  Cherelle Beth is a 80 y.o. male with PMHX of high blood pressure, recent Covid infection with right popliteal DVT, COPD bronchitis who presents to the emergency department C/O sudden onset and pain and swelling. Patient states he believes he has a spider bite. He denies any trauma or injury. Is worsening swelling taking place dorsum of his right hand. He has no palmar swelling or puncture injury. He is right-hand dominant but is not working with any sharp objects to all other possible instruments to create a puncture wound. His parents are but not seen. Social History  He is a non-smoker nondrinker    Family History  Negative for clotting disorders or arthritic problems      PCP: Miguelina Johnson MD    Current Outpatient Medications   Medication Sig Dispense Refill    clindamycin (CLEOCIN) 300 mg capsule Take 1 Capsule by mouth four (4) times daily for 10 days. 40 Capsule 0    predniSONE (DELTASONE) 20 mg tablet Take 60 mg by mouth daily for 5 days. With Breakfast 15 Tablet 0    HYDROcodone-acetaminophen (Norco) 5-325 mg per tablet Take 1 Tablet by mouth every six (6) hours as needed for Pain for up to 3 days. Max Daily Amount: 4 Tablets. 12 Tablet 0    simethicone (MYLICON) 80 mg chewable tablet Take 1 Tab by mouth every six (6) hours as needed for Flatulence. 20 Tab 0    lisinopriL (PRINIVIL, ZESTRIL) 20 mg tablet Take 1 Tab by mouth daily. 30 Tab 0    rivaroxaban (Xarelto) 20 mg tab tablet Take 1 Tab by mouth daily (with breakfast).  Start this prescription after he has finished 20 days of BID dosing at 15 mg 30 Tab 0    albuterol (PROVENTIL HFA, VENTOLIN HFA, PROAIR HFA) 90 mcg/actuation inhaler Take 1 Puff by inhalation every four (4) hours as needed for Wheezing. 1 Inhaler 0    albuterol (PROVENTIL HFA, VENTOLIN HFA, PROAIR HFA) 90 mcg/actuation inhaler Take 2 Puffs by inhalation every four (4) hours as needed for Wheezing or Shortness of Breath. 1 Inhaler 1       Past History     Past Medical History:  Past Medical History:   Diagnosis Date    COVID-19     Hypertension        Past Surgical History:  History reviewed. No pertinent surgical history. Family History:  Family History   Problem Relation Age of Onset    No Known Problems Mother     No Known Problems Father        Social History:  Social History     Tobacco Use    Smoking status: Never Smoker    Smokeless tobacco: Never Used   Substance Use Topics    Alcohol use: No    Drug use: Never       Allergies:  No Known Allergies      Review of Systems   Review of Systems   Constitutional: Negative. HENT: Negative. Eyes: Negative. Respiratory: Negative. Cardiovascular: Negative. Gastrointestinal: Negative. Endocrine: Negative. Genitourinary: Negative. Musculoskeletal: Positive for arthralgias. Skin: Positive for wound. Allergic/Immunologic: Negative. Neurological: Negative. Hematological: Negative. Psychiatric/Behavioral: Negative. All other systems reviewed and are negative. Physical Exam     Vitals:    09/18/21 0237   BP: (!) 147/88   Pulse: 84   Resp: 20   Temp: 97.4 °F (36.3 °C)   SpO2: 98%   Weight: 83.5 kg (184 lb)   Height: 5' 7\" (1.702 m)     Physical Exam  Vitals and nursing note reviewed. Constitutional:       General: He is not in acute distress. Appearance: He is well-developed. He is not diaphoretic. HENT:      Head: Normocephalic and atraumatic. Eyes:      General: No scleral icterus. Extraocular Movements:      Right eye: Normal extraocular motion. Left eye: Normal extraocular motion.       Conjunctiva/sclera: Conjunctivae normal.      Pupils: Pupils are equal, round, and reactive to light. Neck:      Trachea: No tracheal deviation. Cardiovascular:      Rate and Rhythm: Normal rate and regular rhythm. Heart sounds: Normal heart sounds. Pulmonary:      Effort: Pulmonary effort is normal. No respiratory distress. Breath sounds: Normal breath sounds. No stridor. Abdominal:      General: Bowel sounds are normal. There is no distension. Palpations: Abdomen is soft. Tenderness: There is no abdominal tenderness. There is no rebound. Musculoskeletal:      Right elbow: Normal.      Left elbow: Normal.      Right forearm: Normal.      Left forearm: Normal.      Right wrist: Normal.      Left wrist: Normal.      Right hand: Swelling, tenderness and bony tenderness present. No deformity or lacerations. Decreased range of motion. Normal strength. Normal sensation. There is no disruption of two-point discrimination. Normal capillary refill. Normal pulse. Left hand: Normal.      Cervical back: Normal range of motion and neck supple. Comments: Right hand has swelling and tenderness to the dorsum overlying the second and third metacarpals but without redness or punctate lesion. Radial median ulnar nerve function is preserved. Range of motion of all fingers is good but the MCP joints especially on the index and long finger are limited compared to his contralateral hand. All other joints are grossly unremarkable without abnormalities   Skin:     General: Skin is warm and dry. Capillary Refill: Capillary refill takes less than 2 seconds. Findings: No erythema or rash. Neurological:      Mental Status: He is alert and oriented to person, place, and time. GCS: GCS eye subscore is 4. GCS verbal subscore is 5. GCS motor subscore is 6. Cranial Nerves: No cranial nerve deficit. Motor: No weakness. Psychiatric:         Mood and Affect: Mood normal.         Behavior: Behavior normal.         Thought Content:  Thought content normal.         Judgment: Judgment normal.       Diagnostic Study Results     Labs -  No results found for this or any previous visit (from the past 24 hour(s)). Radiologic Studies -   XR HAND RT MIN 3 V   Final Result      No evidence of acute fracture or dislocation. CT Results  (Last 48 hours)    None        CXR Results  (Last 48 hours)    None          Medications given in the ED-  Medications   clindamycin (CLEOCIN) capsule 300 mg (300 mg Oral Given 9/18/21 0414)   HYDROcodone-acetaminophen (NORCO) 5-325 mg per tablet 1 Tablet (1 Tablet Oral Given 9/18/21 0414)   predniSONE (DELTASONE) tablet 50 mg (50 mg Oral Given 9/18/21 0414)         Medical Decision Making   I am the first provider for this patient. I reviewed the vital signs, available nursing notes, past medical history, past surgical history, family history and social history. Vital Signs-Reviewed the patient's vital signs. Pulse Oximetry Analysis - 98% on room air    Records Reviewed: NURSING NOTES AND PREVIOUS MEDICAL RECORDS    Provider Notes (Medical Decision Making):   Patient has isolated hand swelling with this and insect or spider bite but no puncture wound can be found. Foreign body insect versus blunt trauma versus spontaneous bleeding associated with Xarelto. Regardless is normal cap refill normal function. No foreign body was found on exam or pictures. He was started on steroids painkillers antibiotics and referred to orthopedic or hand specialist.    Procedures:  Procedures    ED Course:   4:01 AM : Initial assessment performed. The patients presenting problems have been discussed, and they are in agreement with the care plan formulated and outlined with them. I have encouraged them to ask questions as they arise throughout their visit. Diagnosis and Disposition       DISCHARGE NOTE:  5:19 AM  Eladio Bosworth Dammons's  results have been reviewed with him. He has been counseled regarding his diagnosis, treatment, and plan.   He verbally conveys understanding and agreement of the signs, symptoms, diagnosis, treatment and prognosis and [id number] agrees to follow up as discussed. He also agrees with the care-plan and conveys that all of his questions have been answered. I have also provided discharge instructions for him that include: educational information regarding their diagnosis and treatment, and list of reasons why they would want to return to the ED prior to their follow-up appointment, should his condition change. He has been provided with education for proper emergency department utilization. CLINICAL IMPRESSION:    1. Hand edema        PLAN:  1. D/C Home  2. Discharge Medication List as of 9/18/2021  5:19 AM      START taking these medications    Details   clindamycin (CLEOCIN) 300 mg capsule Take 1 Capsule by mouth four (4) times daily for 10 days. , Normal, Disp-40 Capsule, R-0      predniSONE (DELTASONE) 20 mg tablet Take 60 mg by mouth daily for 5 days. With Breakfast, Normal, Disp-15 Tablet, R-0      HYDROcodone-acetaminophen (Norco) 5-325 mg per tablet Take 1 Tablet by mouth every six (6) hours as needed for Pain for up to 3 days. Max Daily Amount: 4 Tablets., Normal, Disp-12 Tablet, R-0         CONTINUE these medications which have NOT CHANGED    Details   simethicone (MYLICON) 80 mg chewable tablet Take 1 Tab by mouth every six (6) hours as needed for Flatulence., Normal, Disp-20 Tab, R-0      lisinopriL (PRINIVIL, ZESTRIL) 20 mg tablet Take 1 Tab by mouth daily. , Normal, Disp-30 Tab,R-0      rivaroxaban (Xarelto) 20 mg tab tablet Take 1 Tab by mouth daily (with breakfast).  Start this prescription after he has finished 20 days of BID dosing at 15 mg, Normal, Disp-30 Tab,R-0      !! albuterol (PROVENTIL HFA, VENTOLIN HFA, PROAIR HFA) 90 mcg/actuation inhaler Take 1 Puff by inhalation every four (4) hours as needed for Wheezing., Normal, Disp-1 Inhaler,R-0      !! albuterol (PROVENTIL HFA, VENTOLIN HFA, PROAIR HFA) 90 mcg/actuation inhaler Take 2 Puffs by inhalation every four (4) hours as needed for Wheezing or Shortness of Breath., Print, Disp-1 Inhaler, R-1       !! - Potential duplicate medications found. Please discuss with provider. STOP taking these medications       dexAMETHasone (DECADRON) 6 mg tablet Comments:   Reason for Stopping:             3.   Follow-up Information    None       _______________________________    This note was partially transcribed via voice recognition software. Although efforts have been made to catch any discrepancies, it may contain sound alike words, grammatical errors, or nonsensical words.

## 2022-03-18 PROBLEM — U07.1 COVID-19: Status: ACTIVE | Noted: 2020-12-05

## 2022-03-18 PROBLEM — R09.02 HYPOXEMIA: Status: ACTIVE | Noted: 2020-12-05

## 2022-03-18 PROBLEM — I10 HYPERTENSION: Status: ACTIVE | Noted: 2020-12-05

## 2022-03-19 PROBLEM — Z20.822 SUSPECTED COVID-19 VIRUS INFECTION: Status: ACTIVE | Noted: 2020-12-02

## 2022-03-19 PROBLEM — R05.9 COUGH: Status: ACTIVE | Noted: 2020-12-02

## 2022-03-19 PROBLEM — R14.0 ABDOMINAL BLOATING: Status: ACTIVE | Noted: 2020-12-16

## 2022-03-19 PROBLEM — R52 BODY ACHES: Status: ACTIVE | Noted: 2020-12-02

## 2022-03-19 PROBLEM — I82.431 ACUTE DEEP VEIN THROMBOSIS (DVT) OF POPLITEAL VEIN OF RIGHT LOWER EXTREMITY (HCC): Status: ACTIVE | Noted: 2020-12-10

## 2022-03-20 PROBLEM — K59.00 CONSTIPATION: Status: ACTIVE | Noted: 2020-12-16

## 2022-11-25 ENCOUNTER — HOSPITAL ENCOUNTER (EMERGENCY)
Age: 82
Discharge: HOME | End: 2022-11-25
Attending: EMERGENCY MEDICINE
Payer: MEDICARE

## 2022-11-25 VITALS
WEIGHT: 170 LBS | HEIGHT: 67 IN | DIASTOLIC BLOOD PRESSURE: 73 MMHG | SYSTOLIC BLOOD PRESSURE: 154 MMHG | BODY MASS INDEX: 26.68 KG/M2 | OXYGEN SATURATION: 97 % | HEART RATE: 83 BPM | RESPIRATION RATE: 18 BRPM | TEMPERATURE: 97.9 F

## 2022-11-25 DIAGNOSIS — J00 NASOPHARYNGITIS: Primary | ICD-10-CM

## 2022-11-25 LAB
FLUAV RNA SPEC QL NAA+PROBE: NOT DETECTED
FLUBV RNA SPEC QL NAA+PROBE: NOT DETECTED
S PYO AG THROAT QL: NEGATIVE
SARS-COV-2, COV2: NOT DETECTED

## 2022-11-25 PROCEDURE — 99283 EMERGENCY DEPT VISIT LOW MDM: CPT

## 2022-11-25 PROCEDURE — 87070 CULTURE OTHR SPECIMN AEROBIC: CPT

## 2022-11-25 PROCEDURE — 87636 SARSCOV2 & INF A&B AMP PRB: CPT

## 2022-11-25 PROCEDURE — 87880 STREP A ASSAY W/OPTIC: CPT

## 2022-11-25 NOTE — ED PROVIDER NOTES
EMERGENCY DEPARTMENT HISTORY AND PHYSICAL EXAM    Date: 11/25/2022  Patient Name: Christiano Pineda    History of Presenting Illness     Chief Complaint   Patient presents with    Sore Throat         History Provided By: Patient    3:57 PM  Christiano Pineda is a 80 y.o. male with PMHX of hypertension who presents to the emergency department C/O sore throat, cough, congestion and mild postnasal drip which began last night. Sore throat was worse but seem to improve some today. He had COVID-19 2 years ago without complication. He is vaccinated x2 doses, no booster. He presents today requesting COVID 19 test. Pt denies known sick contacts, fever, ear pain, shortness of breath, difficulty swallowing, and any other sxs or complaints. PCP: Tan Rivas MD    Current Outpatient Medications   Medication Sig Dispense Refill    simethicone (MYLICON) 80 mg chewable tablet Take 1 Tab by mouth every six (6) hours as needed for Flatulence. 20 Tab 0    lisinopriL (PRINIVIL, ZESTRIL) 20 mg tablet Take 1 Tab by mouth daily. 30 Tab 0    rivaroxaban (Xarelto) 20 mg tab tablet Take 1 Tab by mouth daily (with breakfast). Start this prescription after he has finished 20 days of BID dosing at 15 mg 30 Tab 0    albuterol (PROVENTIL HFA, VENTOLIN HFA, PROAIR HFA) 90 mcg/actuation inhaler Take 1 Puff by inhalation every four (4) hours as needed for Wheezing. 1 Inhaler 0    albuterol (PROVENTIL HFA, VENTOLIN HFA, PROAIR HFA) 90 mcg/actuation inhaler Take 2 Puffs by inhalation every four (4) hours as needed for Wheezing or Shortness of Breath. 1 Inhaler 1       Past History     Past Medical History:  Past Medical History:   Diagnosis Date    COVID-19     Hypertension        Past Surgical History:  History reviewed. No pertinent surgical history.     Family History:  Family History   Problem Relation Age of Onset    No Known Problems Mother     No Known Problems Father        Social History:  Social History     Tobacco Use Smoking status: Never    Smokeless tobacco: Never   Substance Use Topics    Alcohol use: No    Drug use: Never       Allergies:  No Known Allergies      Review of Systems   Review of Systems   Constitutional:  Negative for fever. HENT:  Positive for congestion and sore throat. Respiratory:  Positive for cough. All other systems reviewed and are negative. Physical Exam     Vitals:    11/25/22 1455   BP: (!) 154/73   Pulse: 83   Resp: 18   Temp: 97.9 °F (36.6 °C)   SpO2: 97%   Weight: 77.1 kg (170 lb)   Height: 5' 7\" (1.702 m)     Physical Exam  Vital signs and nursing notes reviewed. CONSTITUTIONAL: Alert. Well-appearing; well-nourished; in no apparent distress. HEAD: Normocephalic; atraumatic. EYES:  Conjunctiva clear. ENT: TM's normal. External ear normal. Normal nose; no rhinorrhea. Normal pharynx. Tonsils not enlarged without exudate. Moist mucus membranes. NECK: Supple; FROM without difficulty, non-tender; no cervical lymphadenopathy. CV: Normal S1, S2; no murmurs, rubs, or gallops. No chest wall tenderness. RESPIRATORY: Normal chest excursion with respiration; breath sounds clear and equal bilaterally; no wheezes, rhonchi, or rales. SKIN: Normal for age and race; warm; dry; good turgor; no apparent lesions or exudate. NEURO: A & O x3. PSYCH:  Mood and affect appropriate.            Diagnostic Study Results     Labs -     Recent Results (from the past 12 hour(s))   COVID-19 WITH INFLUENZA A/B    Collection Time: 11/25/22  3:00 PM   Result Value Ref Range    SARS-CoV-2 by PCR Not detected NOTD      Influenza A by PCR Not detected NOTD      Influenza B by PCR Not detected NOTD     POC GROUP A STREP    Collection Time: 11/25/22  4:41 PM   Result Value Ref Range    Group A strep (POC) Negative NEG         Radiologic Studies -   No orders to display     CT Results  (Last 48 hours)      None          CXR Results  (Last 48 hours)      None            Medications given in the ED-  Medications - No data to display      Medical Decision Making   I am the first provider for this patient. I reviewed the vital signs, available nursing notes, past medical history, past surgical history, family history and social history. Vital Signs-Reviewed the patient's vital signs. Records Reviewed: Nursing Notes      Procedures:  Procedures    ED Course:  3:57 PM   Initial assessment performed. The patients presenting problems have been discussed, and they are in agreement with the care plan formulated and outlined with them. I have encouraged them to ask questions as they arise throughout their visit. Provider Notes (Medical Decision Making): Yola Bautista is a 80 y.o. male presents with sore throat congestion and mild cough that began last night, concerned about COVID-19. Sore throat improved today, he has no difficulty swallowing or difficulty breathing. His vitals are stable, exam is unremarkable. COVID/flu tests negative. Consistent with viral illness. Over-the-counter medications for symptomatic relief and ED return precautions discussed. Diagnosis and Disposition       DISCHARGE NOTE:    Chris Thomas  results have been reviewed with him. He has been counseled regarding his diagnosis, treatment, and plan. He verbally conveys understanding and agreement of the signs, symptoms, diagnosis, treatment and prognosis and additionally agrees to follow up as discussed. He also agrees with the care-plan and conveys that all of his questions have been answered. I have also provided discharge instructions for him that include: educational information regarding their diagnosis and treatment, and list of reasons why they would want to return to the ED prior to their follow-up appointment, should his condition change. He has been provided with education for proper emergency department utilization. CLINICAL IMPRESSION:    1. Nasopharyngitis        PLAN:  1. D/C Home  2. Current Discharge Medication List        3. Follow-up Information       Follow up With Specialties Details Why Contact Info    Jaqueline Bella MD Internal Medicine Physician  As needed [address] [facility]      [facility] EMERGENCY DEPT Emergency Medicine  As needed, If symptoms worsen [address] [address]  [phone]          _______________________________      Please note that this dictation was completed with [employer], the computer voice recognition software. Quite often unanticipated grammatical, syntax, homophones, and other interpretive errors are inadvertently transcribed by the computer software. Please disregard these errors. Please excuse any errors that have escaped final proofreading.

## 2022-11-27 LAB
BACTERIA SPEC CULT: NORMAL
SERVICE CMNT-IMP: NORMAL

## 2023-07-24 ENCOUNTER — ANESTHESIA EVENT (OUTPATIENT)
Facility: HOSPITAL | Age: 83
End: 2023-07-24
Payer: MEDICARE

## 2023-07-24 RX ORDER — SODIUM CHLORIDE 0.9 % (FLUSH) 0.9 %
5-40 SYRINGE (ML) INJECTION EVERY 12 HOURS SCHEDULED
Status: CANCELLED | OUTPATIENT
Start: 2023-07-24

## 2023-07-24 RX ORDER — SODIUM CHLORIDE 9 MG/ML
INJECTION, SOLUTION INTRAVENOUS PRN
Status: CANCELLED | OUTPATIENT
Start: 2023-07-24

## 2023-07-24 RX ORDER — SODIUM CHLORIDE 0.9 % (FLUSH) 0.9 %
5-40 SYRINGE (ML) INJECTION PRN
Status: CANCELLED | OUTPATIENT
Start: 2023-07-24

## 2023-07-25 ENCOUNTER — ANESTHESIA (OUTPATIENT)
Facility: HOSPITAL | Age: 83
End: 2023-07-25
Payer: MEDICARE

## 2023-07-25 ENCOUNTER — HOSPITAL ENCOUNTER (OUTPATIENT)
Facility: HOSPITAL | Age: 83
Setting detail: OUTPATIENT SURGERY
Discharge: HOME | End: 2023-07-25
Attending: OPHTHALMOLOGY | Admitting: OPHTHALMOLOGY
Payer: MEDICARE

## 2023-07-25 VITALS
OXYGEN SATURATION: 99 % | DIASTOLIC BLOOD PRESSURE: 63 MMHG | RESPIRATION RATE: 15 BRPM | SYSTOLIC BLOOD PRESSURE: 156 MMHG | TEMPERATURE: 97.2 F | HEIGHT: 67 IN | WEIGHT: 187.3 LBS | HEART RATE: 59 BPM | BODY MASS INDEX: 29.4 KG/M2

## 2023-07-25 PROCEDURE — 2500000003 HC RX 250 WO HCPCS: Performed by: OPHTHALMOLOGY

## 2023-07-25 PROCEDURE — 2720000010 HC SURG SUPPLY STERILE: Performed by: OPHTHALMOLOGY

## 2023-07-25 PROCEDURE — 2709999900 HC NON-CHARGEABLE SUPPLY: Performed by: OPHTHALMOLOGY

## 2023-07-25 PROCEDURE — 6370000000 HC RX 637 (ALT 250 FOR IP): Performed by: OPHTHALMOLOGY

## 2023-07-25 PROCEDURE — 7100000010 HC PHASE II RECOVERY - FIRST 15 MIN: Performed by: OPHTHALMOLOGY

## 2023-07-25 PROCEDURE — 2580000003 HC RX 258: Performed by: OPHTHALMOLOGY

## 2023-07-25 PROCEDURE — 3600000002 HC SURGERY LEVEL 2 BASE: Performed by: OPHTHALMOLOGY

## 2023-07-25 PROCEDURE — 3600000012 HC SURGERY LEVEL 2 ADDTL 15MIN: Performed by: OPHTHALMOLOGY

## 2023-07-25 PROCEDURE — 6360000002 HC RX W HCPCS: Performed by: REGISTERED NURSE

## 2023-07-25 PROCEDURE — 3700000001 HC ADD 15 MINUTES (ANESTHESIA): Performed by: OPHTHALMOLOGY

## 2023-07-25 PROCEDURE — 6360000002 HC RX W HCPCS: Performed by: OPHTHALMOLOGY

## 2023-07-25 PROCEDURE — 3700000000 HC ANESTHESIA ATTENDED CARE: Performed by: OPHTHALMOLOGY

## 2023-07-25 PROCEDURE — 6360000002 HC RX W HCPCS: Performed by: NURSE ANESTHETIST, CERTIFIED REGISTERED

## 2023-07-25 PROCEDURE — 7100000011 HC PHASE II RECOVERY - ADDTL 15 MIN: Performed by: OPHTHALMOLOGY

## 2023-07-25 RX ORDER — MIDAZOLAM HYDROCHLORIDE 1 MG/ML
INJECTION INTRAMUSCULAR; INTRAVENOUS PRN
Status: DISCONTINUED | OUTPATIENT
Start: 2023-07-25 | End: 2023-07-25 | Stop reason: SDUPTHER

## 2023-07-25 RX ORDER — OFLOXACIN 3 MG/ML
1 SOLUTION/ DROPS OPHTHALMIC ONCE
Status: COMPLETED | OUTPATIENT
Start: 2023-07-25 | End: 2023-07-25

## 2023-07-25 RX ORDER — SODIUM CHLORIDE, SODIUM LACTATE, POTASSIUM CHLORIDE, CALCIUM CHLORIDE 600; 310; 30; 20 MG/100ML; MG/100ML; MG/100ML; MG/100ML
INJECTION, SOLUTION INTRAVENOUS CONTINUOUS
Status: DISCONTINUED | OUTPATIENT
Start: 2023-07-25 | End: 2023-07-25 | Stop reason: HOSPADM

## 2023-07-25 RX ORDER — PHENYLEPHRINE HCL 2.5 %
1 DROPS OPHTHALMIC (EYE)
Status: COMPLETED | OUTPATIENT
Start: 2023-07-25 | End: 2023-07-25

## 2023-07-25 RX ORDER — BALANCED SALT SOLUTION 6.4; .75; .48; .3; 3.9; 1.7 MG/ML; MG/ML; MG/ML; MG/ML; MG/ML; MG/ML
SOLUTION OPHTHALMIC PRN
Status: DISCONTINUED | OUTPATIENT
Start: 2023-07-25 | End: 2023-07-25 | Stop reason: ALTCHOICE

## 2023-07-25 RX ORDER — LIDOCAIN/PHENYLEPH/BSS NO.2/PF 1 %-1.5 %
SYRINGE (ML) INTRAOCULAR PRN
Status: DISCONTINUED | OUTPATIENT
Start: 2023-07-25 | End: 2023-07-25 | Stop reason: ALTCHOICE

## 2023-07-25 RX ORDER — SODIUM CHLORIDE, POTASSIUM CHLORIDE, CALCIUM CHLORIDE, MAGNESIUM CHLORIDE, SODIUM ACETATE, AND SODIUM CITRATE 6.4; .75; .48; .3; 3.9; 1.7 MG/ML; MG/ML; MG/ML; MG/ML; MG/ML; MG/ML
SOLUTION IRRIGATION PRN
Status: DISCONTINUED | OUTPATIENT
Start: 2023-07-25 | End: 2023-07-25 | Stop reason: ALTCHOICE

## 2023-07-25 RX ORDER — EPINEPHRINE 1 MG/ML
INJECTION, SOLUTION, CONCENTRATE INTRAVENOUS PRN
Status: DISCONTINUED | OUTPATIENT
Start: 2023-07-25 | End: 2023-07-25 | Stop reason: ALTCHOICE

## 2023-07-25 RX ORDER — TROPICAMIDE 10 MG/ML
1 SOLUTION/ DROPS OPHTHALMIC
Status: COMPLETED | OUTPATIENT
Start: 2023-07-25 | End: 2023-07-25

## 2023-07-25 RX ORDER — FENTANYL CITRATE 50 UG/ML
INJECTION, SOLUTION INTRAMUSCULAR; INTRAVENOUS PRN
Status: DISCONTINUED | OUTPATIENT
Start: 2023-07-25 | End: 2023-07-25 | Stop reason: SDUPTHER

## 2023-07-25 RX ADMIN — PHENYLEPHRINE HYDROCHLORIDE 1 DROP: 2.5 SOLUTION/ DROPS OPHTHALMIC at 09:06

## 2023-07-25 RX ADMIN — LIDOCAINE HYDROCHLORIDE: 35 GEL OPHTHALMIC at 09:20

## 2023-07-25 RX ADMIN — TROPICAMIDE 1 DROP: 10 SOLUTION/ DROPS OPHTHALMIC at 09:20

## 2023-07-25 RX ADMIN — PHENYLEPHRINE HYDROCHLORIDE 1 DROP: 2.5 SOLUTION/ DROPS OPHTHALMIC at 09:11

## 2023-07-25 RX ADMIN — TROPICAMIDE 1 DROP: 10 SOLUTION/ DROPS OPHTHALMIC at 09:06

## 2023-07-25 RX ADMIN — FENTANYL CITRATE 50 MCG: 50 INJECTION, SOLUTION INTRAMUSCULAR; INTRAVENOUS at 10:55

## 2023-07-25 RX ADMIN — TROPICAMIDE 1 DROP: 10 SOLUTION/ DROPS OPHTHALMIC at 09:16

## 2023-07-25 RX ADMIN — PHENYLEPHRINE HYDROCHLORIDE 1 DROP: 2.5 SOLUTION/ DROPS OPHTHALMIC at 09:16

## 2023-07-25 RX ADMIN — TROPICAMIDE 1 DROP: 10 SOLUTION/ DROPS OPHTHALMIC at 09:11

## 2023-07-25 RX ADMIN — PHENYLEPHRINE HYDROCHLORIDE 1 DROP: 2.5 SOLUTION/ DROPS OPHTHALMIC at 09:20

## 2023-07-25 RX ADMIN — OFLOXACIN 1 DROP: 3 SOLUTION/ DROPS OPHTHALMIC at 09:02

## 2023-07-25 RX ADMIN — SODIUM CHLORIDE, POTASSIUM CHLORIDE, SODIUM LACTATE AND CALCIUM CHLORIDE: 600; 310; 30; 20 INJECTION, SOLUTION INTRAVENOUS at 09:05

## 2023-07-25 RX ADMIN — MIDAZOLAM 2 MG: 1 INJECTION INTRAMUSCULAR; INTRAVENOUS at 10:51

## 2023-07-25 RX ADMIN — FENTANYL CITRATE 50 MCG: 50 INJECTION, SOLUTION INTRAMUSCULAR; INTRAVENOUS at 10:52

## 2023-07-25 ASSESSMENT — PAIN - FUNCTIONAL ASSESSMENT: PAIN_FUNCTIONAL_ASSESSMENT: 0-10

## 2023-07-25 NOTE — PERIOP NOTE
Reviewed PTA medication list with patient/caregiver and patient/caregiver denies any additional medications. Patient admits to having a responsible adult care for them at home for at least 24 hours after surgery. Patient encouraged to use gown warming system and informed that using said warming gown to regulate body temperature prior to a procedure has been shown to help reduce the risks of blood clots and infection. Patient's pharmacy of choice verified and documented in PTA medication section. Dual skin assessment & fall risk band verification completed with Lien Serrano.

## 2023-07-25 NOTE — INTERVAL H&P NOTE
Update History & Physical    The patient's History and Physical of July 25, 2023 was reviewed with the patient and I examined the patient. There was no change. The surgical site was confirmed by the patient and me. Plan: The risks, benefits, expected outcome, and alternative to the recommended procedure have been discussed with the patient. Patient understands and wants to proceed with the procedure.      Electronically signed by Therhiannon Posadas MD on 7/25/2023 at 10:14 AM

## 2023-07-25 NOTE — PERIOP NOTE
TRANSFER - IN REPORT:    Verbal report received from Rufino Bolaños RN on Carrie Holm  being received from OR for routine progression of patient care      Report consisted of patient's Situation, Background, Assessment and   Recommendations(SBAR). Information from the following report(s) Intake/Output and MAR was reviewed with the receiving nurse. Opportunity for questions and clarification was provided. Assessment completed upon patient's arrival to unit and care assumed.

## 2023-07-25 NOTE — PERIOP NOTE
TRANSFER - IN REPORT:    Verbal report received from tom roth on Rachana Leaver  being received from or for routine post-op      Report consisted of patient's Situation, Background, Assessment and   Recommendations(SBAR). Information from the following report(s) Nurse Handoff Report was reviewed with the receiving nurse. Opportunity for questions and clarification was provided. Assessment completed upon patient's arrival to unit and care assumed.

## 2023-07-25 NOTE — DISCHARGE INSTRUCTIONS
Follow ALL directions given by Dr. Joanne Mata   Call provider for headache, eye pressure, nausea, vomiting   Wear eye shield when resting and sleeping, do not touch operative eye      DISCHARGE SUMMARY from Nurse    PATIENT INSTRUCTIONS:    After general anesthesia or intravenous sedation, for 24 hours or while taking prescription Narcotics:  Limit your activities  Do not drive and operate hazardous machinery  Do not make important personal or business decisions  Do  not drink alcoholic beverages  If you have not urinated within 8 hours after discharge, please contact your surgeon on call. Report the following to your surgeon:  Excessive pain, swelling, redness or odor of or around the surgical area  Temperature over 100.5  Nausea and vomiting lasting longer than 4 hours or if unable to take medications  Any signs of decreased circulation or nerve impairment to extremity: change in color, persistent  numbness, tingling, coldness or increase pain  Any questions    What to do at Home:  Recommended activity: ambulate in house and no lifting, Driving, or Strenuous exercise until advised    If you experience any of the following symptoms above, please follow up with Dr. Joanne Mata. *  Please give a list of your current medications to your Primary Care Provider. *  Please update this list whenever your medications are discontinued, doses are      changed, or new medications (including over-the-counter products) are added. *  Please carry medication information at all times in case of emergency situations. These are general instructions for a healthy lifestyle:    No smoking/ No tobacco products/ Avoid exposure to second hand smoke  Surgeon General's Warning:  Quitting smoking now greatly reduces serious risk to your health.     Obesity, smoking, and sedentary lifestyle greatly increases your risk for illness    A healthy diet, regular physical exercise & weight monitoring are important for maintaining a healthy

## 2023-07-25 NOTE — ANESTHESIA PRE PROCEDURE
Ladonna Arteaga in the last 72 hours. Coags:   Lab Results   Component Value Date/Time    PROTIME 13.9 12/04/2020 11:15 PM    INR 1.1 12/04/2020 11:15 PM    APTT 29.6 12/04/2020 11:15 PM       HCG (If Applicable): No results found for: PREGTESTUR, PREGSERUM, HCG, HCGQUANT     ABGs:   Lab Results   Component Value Date/Time    PHART 7.40 12/04/2020 11:42 PM    PO2ART 69 12/04/2020 11:42 PM    EZT8MIE 36.4 12/04/2020 11:42 PM    XKC7TBK 22.4 12/04/2020 11:42 PM        Type & Screen (If Applicable):  No results found for: LABABO, LABRH    Drug/Infectious Status (If Applicable):  Lab Results   Component Value Date/Time    HEPCAB 0.03 12/07/2020 04:20 AM    HEPCAB Negative 12/07/2020 04:20 AM       COVID-19 Screening (If Applicable):   Lab Results   Component Value Date/Time    COVID19 Not detected 11/25/2022 03:00 PM           Anesthesia Evaluation  Patient summary reviewed and Nursing notes reviewed  Airway: Mallampati: III  TM distance: >3 FB   Neck ROM: limited  Mouth opening: > = 3 FB   Dental:          Pulmonary:Negative Pulmonary ROS and normal exam  breath sounds clear to auscultation                             Cardiovascular:  Exercise tolerance: good (>4 METS),   (+) hypertension:,         Rhythm: regular  Rate: normal                    Neuro/Psych:   Negative Neuro/Psych ROS              GI/Hepatic/Renal: Neg GI/Hepatic/Renal ROS            Endo/Other: Negative Endo/Other ROS                    Abdominal:             Vascular: negative vascular ROS. Other Findings:           Anesthesia Plan      MAC     ASA 2             Anesthetic plan and risks discussed with patient. Plan discussed with CRNA.                     Ash Hernandez MD   7/25/2023

## 2023-08-08 NOTE — OP NOTE
Cataract Operative Note      Patient: Alyssia Damon               Sex: male          DOA: 7/25/2023         YOB: 1940      Age:  80 y.o.        LOS:  LOS: 0 days     Preoperative Diagnosis: Cataract right eye    Postoperative Diagnosis:  Cataract  right eye  Surgeon: J Luis Choi MD, M.D. Anesthesia:  Topical anesthesia  Procedure:  Phacoemulsification of posterior chamber for intraocular lens implantation right    Fluids:  0    Procedure in Detail: The operative eye was prepped and draped in the usual fashion. A lid speculum was placed in the operative eye. A clear cornea approach was utilized. A paracentesis incision(s) was constructed with a 1 mm slit knife. One percent preservative-free lidocaine followed by viscoelastic was instilled into the anterior chamber. A clear corneal incision was made with a slit knife. A continuous curvilinear capsulorrhexis was constructed followed by hydrodissection. A phacoemulsification tip was placed into the eye, and the lens nucleus was emulsified. The irrigation/aspiration device was then used to remove any remaining cortical material.  Polishing of the capsule was performed as needed. The intraocular lens was then placed into the capsular bag after it was re-inflated with viscoelastic. The remaining viscoelastic was then removed using the irrigation/aspiration device. BSS on a cannula was then used to hydrate the wound edges. At the end of the procedure the wound was found to be watertight, the anterior chamber was deep and the pupil round. No blood loss during surgery. An antibiotic and anti-inflammatroy was placed into the operative eye. The lid speculum was removed. Protective sunglasses were then placed onto the patient. The patient was taken to the [address] [place] [address] Unit (PACU) in good condition having tolerated the procedure well. Estimated Blood Loss: 0                 Implants:   Implant Name Type Inv.  Item Serial No.

## 2023-10-23 ENCOUNTER — ANESTHESIA EVENT (OUTPATIENT)
Facility: HOSPITAL | Age: 83
End: 2023-10-23
Payer: MEDICARE

## 2023-10-23 RX ORDER — SODIUM CHLORIDE 0.9 % (FLUSH) 0.9 %
5-40 SYRINGE (ML) INJECTION EVERY 12 HOURS SCHEDULED
Status: CANCELLED | OUTPATIENT
Start: 2023-10-23

## 2023-10-23 RX ORDER — SODIUM CHLORIDE 0.9 % (FLUSH) 0.9 %
5-40 SYRINGE (ML) INJECTION PRN
Status: CANCELLED | OUTPATIENT
Start: 2023-10-23

## 2023-10-23 RX ORDER — SODIUM CHLORIDE 9 MG/ML
INJECTION, SOLUTION INTRAVENOUS PRN
Status: CANCELLED | OUTPATIENT
Start: 2023-10-23

## 2023-10-24 ENCOUNTER — HOSPITAL ENCOUNTER (OUTPATIENT)
Facility: HOSPITAL | Age: 83
Setting detail: OUTPATIENT SURGERY
Discharge: HOME | End: 2023-10-24
Attending: OPHTHALMOLOGY | Admitting: OPHTHALMOLOGY
Payer: MEDICARE

## 2023-10-24 ENCOUNTER — ANESTHESIA (OUTPATIENT)
Facility: HOSPITAL | Age: 83
End: 2023-10-24
Payer: MEDICARE

## 2023-10-24 VITALS
HEART RATE: 70 BPM | OXYGEN SATURATION: 99 % | TEMPERATURE: 97.8 F | RESPIRATION RATE: 15 BRPM | BODY MASS INDEX: 29.65 KG/M2 | HEIGHT: 67 IN | WEIGHT: 188.9 LBS | DIASTOLIC BLOOD PRESSURE: 77 MMHG | SYSTOLIC BLOOD PRESSURE: 122 MMHG

## 2023-10-24 PROBLEM — H53.8 BLURRED VISION: Status: ACTIVE | Noted: 2023-10-24

## 2023-10-24 PROBLEM — H53.8 BLURRED VISION: Status: RESOLVED | Noted: 2023-10-24 | Resolved: 2023-10-24

## 2023-10-24 PROCEDURE — 3700000001 HC ADD 15 MINUTES (ANESTHESIA): Performed by: OPHTHALMOLOGY

## 2023-10-24 PROCEDURE — 3600000012 HC SURGERY LEVEL 2 ADDTL 15MIN: Performed by: OPHTHALMOLOGY

## 2023-10-24 PROCEDURE — 6360000002 HC RX W HCPCS: Performed by: OPHTHALMOLOGY

## 2023-10-24 PROCEDURE — 3700000000 HC ANESTHESIA ATTENDED CARE: Performed by: OPHTHALMOLOGY

## 2023-10-24 PROCEDURE — 6370000000 HC RX 637 (ALT 250 FOR IP): Performed by: OPHTHALMOLOGY

## 2023-10-24 PROCEDURE — 7100000011 HC PHASE II RECOVERY - ADDTL 15 MIN: Performed by: OPHTHALMOLOGY

## 2023-10-24 PROCEDURE — V2632 POST CHMBR INTRAOCULAR LENS: HCPCS | Performed by: OPHTHALMOLOGY

## 2023-10-24 PROCEDURE — 2580000003 HC RX 258: Performed by: OPHTHALMOLOGY

## 2023-10-24 PROCEDURE — 7100000010 HC PHASE II RECOVERY - FIRST 15 MIN: Performed by: OPHTHALMOLOGY

## 2023-10-24 PROCEDURE — 3600000002 HC SURGERY LEVEL 2 BASE: Performed by: OPHTHALMOLOGY

## 2023-10-24 PROCEDURE — 6360000002 HC RX W HCPCS: Performed by: NURSE ANESTHETIST, CERTIFIED REGISTERED

## 2023-10-24 PROCEDURE — 2709999900 HC NON-CHARGEABLE SUPPLY: Performed by: OPHTHALMOLOGY

## 2023-10-24 PROCEDURE — 2500000003 HC RX 250 WO HCPCS: Performed by: OPHTHALMOLOGY

## 2023-10-24 PROCEDURE — 2580000003 HC RX 258: Performed by: NURSE ANESTHETIST, CERTIFIED REGISTERED

## 2023-10-24 RX ORDER — SODIUM CHLORIDE, SODIUM LACTATE, POTASSIUM CHLORIDE, CALCIUM CHLORIDE 600; 310; 30; 20 MG/100ML; MG/100ML; MG/100ML; MG/100ML
INJECTION, SOLUTION INTRAVENOUS CONTINUOUS
Status: DISCONTINUED | OUTPATIENT
Start: 2023-10-24 | End: 2023-10-24 | Stop reason: HOSPADM

## 2023-10-24 RX ORDER — EPINEPHRINE 1 MG/ML
INJECTION, SOLUTION, CONCENTRATE INTRAVENOUS PRN
Status: DISCONTINUED | OUTPATIENT
Start: 2023-10-24 | End: 2023-10-24 | Stop reason: ALTCHOICE

## 2023-10-24 RX ORDER — FENTANYL CITRATE 50 UG/ML
INJECTION, SOLUTION INTRAMUSCULAR; INTRAVENOUS PRN
Status: DISCONTINUED | OUTPATIENT
Start: 2023-10-24 | End: 2023-10-24 | Stop reason: SDUPTHER

## 2023-10-24 RX ORDER — OFLOXACIN 3 MG/ML
1 SOLUTION/ DROPS OPHTHALMIC ONCE
Status: COMPLETED | OUTPATIENT
Start: 2023-10-24 | End: 2023-10-24

## 2023-10-24 RX ORDER — PHENYLEPHRINE HYDROCHLORIDE 25 MG/ML
1 SOLUTION/ DROPS OPHTHALMIC
Status: COMPLETED | OUTPATIENT
Start: 2023-10-24 | End: 2023-10-24

## 2023-10-24 RX ORDER — LIDOCAIN/PHENYLEPH/BSS NO.2/PF 1 %-1.5 %
SYRINGE (ML) INTRAOCULAR PRN
Status: DISCONTINUED | OUTPATIENT
Start: 2023-10-24 | End: 2023-10-24 | Stop reason: ALTCHOICE

## 2023-10-24 RX ORDER — MIDAZOLAM HYDROCHLORIDE 1 MG/ML
INJECTION INTRAMUSCULAR; INTRAVENOUS PRN
Status: DISCONTINUED | OUTPATIENT
Start: 2023-10-24 | End: 2023-10-24 | Stop reason: SDUPTHER

## 2023-10-24 RX ORDER — SODIUM CHLORIDE, POTASSIUM CHLORIDE, CALCIUM CHLORIDE, MAGNESIUM CHLORIDE, SODIUM ACETATE, AND SODIUM CITRATE 6.4; .75; .48; .3; 3.9; 1.7 MG/ML; MG/ML; MG/ML; MG/ML; MG/ML; MG/ML
SOLUTION IRRIGATION PRN
Status: DISCONTINUED | OUTPATIENT
Start: 2023-10-24 | End: 2023-10-24 | Stop reason: ALTCHOICE

## 2023-10-24 RX ORDER — TROPICAMIDE 10 MG/ML
1 SOLUTION/ DROPS OPHTHALMIC
Status: COMPLETED | OUTPATIENT
Start: 2023-10-24 | End: 2023-10-24

## 2023-10-24 RX ORDER — BALANCED SALT SOLUTION 6.4; .75; .48; .3; 3.9; 1.7 MG/ML; MG/ML; MG/ML; MG/ML; MG/ML; MG/ML
SOLUTION OPHTHALMIC PRN
Status: DISCONTINUED | OUTPATIENT
Start: 2023-10-24 | End: 2023-10-24 | Stop reason: ALTCHOICE

## 2023-10-24 RX ORDER — SODIUM CHLORIDE, SODIUM LACTATE, POTASSIUM CHLORIDE, CALCIUM CHLORIDE 600; 310; 30; 20 MG/100ML; MG/100ML; MG/100ML; MG/100ML
INJECTION, SOLUTION INTRAVENOUS CONTINUOUS PRN
Status: DISCONTINUED | OUTPATIENT
Start: 2023-10-24 | End: 2023-10-24 | Stop reason: SDUPTHER

## 2023-10-24 RX ADMIN — SODIUM CHLORIDE, POTASSIUM CHLORIDE, SODIUM LACTATE AND CALCIUM CHLORIDE: 600; 310; 30; 20 INJECTION, SOLUTION INTRAVENOUS at 12:37

## 2023-10-24 RX ADMIN — PHENYLEPHRINE HYDROCHLORIDE 1 DROP: 2.5 SOLUTION/ DROPS OPHTHALMIC at 12:43

## 2023-10-24 RX ADMIN — FENTANYL CITRATE 50 MCG: 50 INJECTION, SOLUTION INTRAMUSCULAR; INTRAVENOUS at 14:03

## 2023-10-24 RX ADMIN — MIDAZOLAM 2 MG: 1 INJECTION INTRAMUSCULAR; INTRAVENOUS at 14:01

## 2023-10-24 RX ADMIN — TROPICAMIDE 1 DROP: 10 SOLUTION/ DROPS OPHTHALMIC at 12:48

## 2023-10-24 RX ADMIN — TROPICAMIDE 1 DROP: 10 SOLUTION/ DROPS OPHTHALMIC at 12:37

## 2023-10-24 RX ADMIN — TROPICAMIDE 1 DROP: 10 SOLUTION/ DROPS OPHTHALMIC at 12:43

## 2023-10-24 RX ADMIN — LIDOCAINE HYDROCHLORIDE: 35 GEL OPHTHALMIC at 12:49

## 2023-10-24 RX ADMIN — OFLOXACIN 1 DROP: 3 SOLUTION OPHTHALMIC at 12:32

## 2023-10-24 RX ADMIN — FENTANYL CITRATE 50 MCG: 50 INJECTION, SOLUTION INTRAMUSCULAR; INTRAVENOUS at 14:05

## 2023-10-24 RX ADMIN — SODIUM CHLORIDE, SODIUM LACTATE, POTASSIUM CHLORIDE, AND CALCIUM CHLORIDE: 600; 310; 30; 20 INJECTION, SOLUTION INTRAVENOUS at 14:01

## 2023-10-24 RX ADMIN — PHENYLEPHRINE HYDROCHLORIDE 1 DROP: 2.5 SOLUTION/ DROPS OPHTHALMIC at 12:32

## 2023-10-24 RX ADMIN — TROPICAMIDE 1 DROP: 10 SOLUTION/ DROPS OPHTHALMIC at 12:32

## 2023-10-24 RX ADMIN — PHENYLEPHRINE HYDROCHLORIDE 1 DROP: 2.5 SOLUTION/ DROPS OPHTHALMIC at 12:48

## 2023-10-24 RX ADMIN — PHENYLEPHRINE HYDROCHLORIDE 1 DROP: 2.5 SOLUTION/ DROPS OPHTHALMIC at 12:37

## 2023-10-24 ASSESSMENT — PAIN - FUNCTIONAL ASSESSMENT: PAIN_FUNCTIONAL_ASSESSMENT: 0-10

## 2023-10-24 NOTE — PERIOP NOTE
Pt. Used restroom in pre-op area with assistance. Patient placed on [employer] Paws for a minimum of 30 min in  Preop. Pt denies having an active cough and confirms being able to lie still for 20 minutes.

## 2023-10-24 NOTE — INTERVAL H&P NOTE
Update History & Physical    The patient's History and Physical of October 24, 2023 was reviewed with the patient and I examined the patient. There was no change. The surgical site was confirmed by the patient and me. Plan: The risks, benefits, expected outcome, and alternative to the recommended procedure have been discussed with the patient. Patient understands and wants to proceed with the procedure.      Electronically signed by Bienvenido Malone MD on 10/24/2023 at 12:35 PM

## 2023-10-24 NOTE — DISCHARGE INSTRUCTIONS
FOLLOW ALL DISCHARGE INSTRUCTIONS FROM DR. DEJESUS  FOLLOW DROP SCHEDULE FROM DR. DEJESUS'S OFFICE  FOLLOW UP WITH DR. Wale Amado TOMORROW AS SCHEDULED - BRING ALL EYE DROPS TO FOLLOW UP APPOINTMENT  WEAR EYE SHIELD EVERY TIME YOU TAKE A NAP AND WHEN YOU GO TO BED         DISCHARGE SUMMARY from Nurse    PATIENT INSTRUCTIONS:    After general anesthesia or intravenous sedation, for 24 hours or while taking prescription Narcotics:  Limit your activities  Do not drive and operate hazardous machinery  Do not make important personal or business decisions  Do  not drink alcoholic beverages  If you have not urinated within 8 hours after discharge, please contact your surgeon on call. Report the following to your surgeon:  Excessive pain, swelling, redness or odor of or around the surgical area  Temperature over 100.5  Nausea and vomiting lasting longer than 4 hours or if unable to take medications  Any signs of decreased circulation or nerve impairment to extremity: change in color, persistent  numbness, tingling, coldness or increase pain  Any questions    What to do at Home:  Recommended activity: ambulate in house and no lifting, Driving, or Strenuous exercise until advised    If you experience any of the following symptoms headache, pressure in the operative eye, nausea, vomiting, please follow up with Dr. Wale Amado. *  Please give a list of your current medications to your Primary Care Provider. *  Please update this list whenever your medications are discontinued, doses are      changed, or new medications (including over-the-counter products) are added. *  Please carry medication information at all times in case of emergency situations. These are general instructions for a healthy lifestyle:    No smoking/ No tobacco products/ Avoid exposure to second hand smoke  Surgeon General's Warning:  Quitting smoking now greatly reduces serious risk to your health.     Obesity, smoking, and sedentary lifestyle greatly

## 2023-10-24 NOTE — ANESTHESIA POSTPROCEDURE EVALUATION
Department of Anesthesiology  Postprocedure Note    Patient: Lesa Mcanlly  MRN: [id number]  YOB: 1940  Date of evaluation: 10/24/2023      Procedure Summary     Date: 10/24/23 Room / Location: [facility] 02 / [facility] MAIN OR    Anesthesia Start: 1401 Anesthesia Stop: 0330    Procedure: CATARACT EXTRACTION WITH INTRAOCULAR LENS IMPLANT LEFT EYE (Left: Eye) Diagnosis:       Cataract, nuclear sclerotic, left eye      (Cataract, nuclear sclerotic, left eye [H25.12])    Surgeons: Tamara Mullen MD Responsible Provider: Joselyn Boyd MD    Anesthesia Type: MAC ASA Status: 2          Anesthesia Type: MAC    Rizwan Phase I:      Rizwan Phase II: Rizwan Score: 10      Anesthesia Post Evaluation    Patient location during evaluation: PACU  Patient participation: complete - patient participated  Level of consciousness: awake  Pain score: 0  Airway patency: patent  Nausea & Vomiting: no nausea and no vomiting  Complications: no  Cardiovascular status: blood pressure returned to baseline  Respiratory status: acceptable  Hydration status: stable  Multimodal analgesia pain management approach  Pain management: satisfactory to patient

## 2023-10-24 NOTE — OP NOTE
Cataract Operative Note      Patient: Yuliya Garcia               Sex: male          DOA: 10/24/2023         YOB: 1940      Age:  80 y.o.        LOS:  LOS: 0 days     Preoperative Diagnosis: Cataract left eye    Postoperative Diagnosis:  Cataract  left eye  Surgeon: Criselda Cool MD, M.D. Anesthesia:  Topical anesthesia  Procedure:  Phacoemulsification of posterior chamber for intraocular lens implantation left    Fluids:  0    Procedure in Detail: The operative eye was prepped and draped in the usual fashion. A lid speculum was placed in the operative eye. A clear cornea approach was utilized. A paracentesis incision(s) was constructed with a 1 mm slit knife. One percent preservative-free lidocaine followed by viscoelastic was instilled into the anterior chamber. A clear corneal incision was made with a slit knife. A continuous curvilinear capsulorrhexis was constructed followed by hydrodissection. A phacoemulsification tip was placed into the eye, and the lens nucleus was emulsified. The irrigation/aspiration device was then used to remove any remaining cortical material.  Polishing of the capsule was performed as needed. The intraocular lens was then placed into the capsular bag after it was re-inflated with viscoelastic. The remaining viscoelastic was then removed using the irrigation/aspiration device. BSS on a cannula was then used to hydrate the wound edges. At the end of the procedure the wound was found to be watertight, the anterior chamber was deep and the pupil round. No blood loss during surgery. An antibiotic and anti-inflammatroy was placed into the operative eye. The lid speculum was removed. Protective sunglasses were then placed onto the patient. The patient was taken to the [address] [place] [address] Unit (PACU) in good condition having tolerated the procedure well.     Estimated Blood Loss: 0                 Implants: * No implants in log *    Specimens: * No

## 2023-10-24 NOTE — PERIOP NOTE
TRANSFER - IN REPORT:    Verbal report received [facility]AM [facility]  [facility] for routine post-op      Report consisted of patient's Situation, Background, Assessment and   Recommendations(SBAR). Information from the following report(s) Nurse Handoff Report, Adult Overview, Surgery Report, Intake/Output, and MAR was reviewed with the receiving nurse. Opportunity for questions and clarification was provided. Assessment completed upon patient's arrival to unit and care assumed.

## 2023-11-10 ENCOUNTER — HOSPITAL ENCOUNTER (EMERGENCY)
Facility: HOSPITAL | Age: 83
Discharge: HOME | End: 2023-11-10
Payer: MEDICARE

## 2023-11-10 VITALS
WEIGHT: 184 LBS | OXYGEN SATURATION: 94 % | TEMPERATURE: 98.4 F | DIASTOLIC BLOOD PRESSURE: 80 MMHG | HEIGHT: 67 IN | BODY MASS INDEX: 28.88 KG/M2 | HEART RATE: 100 BPM | SYSTOLIC BLOOD PRESSURE: 153 MMHG | RESPIRATION RATE: 15 BRPM

## 2023-11-10 DIAGNOSIS — K04.7 DENTAL ABSCESS: Primary | ICD-10-CM

## 2023-11-10 PROCEDURE — 6370000000 HC RX 637 (ALT 250 FOR IP): Performed by: PHYSICIAN ASSISTANT

## 2023-11-10 PROCEDURE — 99283 EMERGENCY DEPT VISIT LOW MDM: CPT

## 2023-11-10 RX ORDER — PENICILLIN V POTASSIUM 500 MG/1
500 TABLET ORAL 4 TIMES DAILY
Qty: 40 TABLET | Refills: 0 | Status: SHIPPED | OUTPATIENT
Start: 2023-11-10 | End: 2023-11-20

## 2023-11-10 RX ORDER — PENICILLIN V POTASSIUM 250 MG/1
500 TABLET ORAL
Status: COMPLETED | OUTPATIENT
Start: 2023-11-10 | End: 2023-11-10

## 2023-11-10 RX ADMIN — PENICILLIN V POTASSIUM 500 MG: 250 TABLET, FILM COATED ORAL at 15:38

## 2023-11-10 ASSESSMENT — PAIN DESCRIPTION - PAIN TYPE: TYPE: ACUTE PAIN

## 2023-11-10 ASSESSMENT — PAIN DESCRIPTION - LOCATION: LOCATION: TEETH

## 2023-11-10 ASSESSMENT — PAIN DESCRIPTION - DESCRIPTORS: DESCRIPTORS: SHARP;ACHING

## 2023-11-10 ASSESSMENT — PAIN - FUNCTIONAL ASSESSMENT: PAIN_FUNCTIONAL_ASSESSMENT: 0-10

## 2023-11-10 ASSESSMENT — PAIN SCALES - GENERAL: PAINLEVEL_OUTOF10: 8

## 2023-11-10 NOTE — ED PROVIDER NOTES
THE [facility] EMERGENCY DEPT  EMERGENCY DEPARTMENT ENCOUNTER       Pt Name: Yuliya Garcia  MRN: [id number]  [address] [facility], [place], operated by [facility] 1940  Date of evaluation: 11/10/2023  PCP: Torito Penny MD  Note Started: 3:50 PM 11/10/23     CHIEF COMPLAINT       Chief Complaint   Patient presents with    Dental Pain        HISTORY OF PRESENT ILLNESS: 1 or more elements      History From: Patient  HPI Limitations: None  Chronic Conditions: None  Social Determinants affecting Dx or Tx: None  Yuliya Garcia is a 80 y.o. male who presents to ED c/o left lower dental pain and jaw swelling which began yesterday. Patient states he thinks his tooth is infected. He denies injury or trauma, sore throat, throat swelling, any other symptoms or complaints. He states he has a dentist but does not really go to the dentist and has many missing and decayed teeth. Nursing Notes were all reviewed and agreed with or any disagreements were addressed in the HPI.     PAST HISTORY     Past Medical History:  Past Medical History:   Diagnosis Date    Cataract 2021    both eyes, had surgery on the right in 7/2023    Chronic cough 2017    not on meds, resolved    Heart murmur 2021    H/O in [place], patient reported that he was not aware of it    History of COVID-19 12/2020    Hospitalized for 7days    Hx of blood clots 12/2020    right popliteal vein while hopsitalized with covid19, was treated with Xarelto in the past    Hypertension 2019    on meds, managed by PCP-Dr. Beata Rahman    Pulmonary lesion 2017    no bx per patient    Wears prescription eyeglasses 2021       Past Surgical History:  Past Surgical History:   Procedure Laterality Date    EYE SURGERY Right 07/25/2023    CATARACT EXTRACTION WITH INTRAOCULAR LENS IMPLANT RIGHT EYE performed by Criselda Cool MD at [facility] Left 10/24/2023    CATARACT EXTRACTION WITH INTRAOCULAR LENS IMPLANT LEFT EYE performed by Criselda Cool MD at THE [facility] MAIN OR       Family History:  Family History MEDICATIONS:     Medication List        START taking these medications      penicillin v potassium 500 MG tablet  Commonly known as: VEETID  Take 1 tablet by mouth 4 times daily for 10 days            ASK your doctor about these medications      amLODIPine 10 MG tablet  Commonly known as: NORVASC     losartan 100 MG tablet  Commonly known as: COZAAR               Where to Get Your Medications        These medications were sent to [facility] [address], [facility] [place] - F [phone]  [address], [address]      Phone: [phone]   penicillin v potassium 500 MG tablet            I am the Primary Clinician of Record. (Please note that parts of this dictation were completed with voice recognition software. Quite often unanticipated grammatical, syntax, homophones, and other interpretive errors are inadvertently transcribed by the computer software. Please disregards these errors.  Please excuse any errors that have escaped final proofreading.)       Elidia Knapp, [place]  11/10/23 2914

## 2023-11-10 NOTE — ED NOTES
Patient reports acute onset of tooth and jaw pain that began yesterday afternoon. Edema has been getting worse today.       Leticia Horton RN  11/10/23 0326

## 2024-07-23 ENCOUNTER — APPOINTMENT (OUTPATIENT)
Facility: HOSPITAL | Age: 84
End: 2024-07-23
Payer: MEDICARE

## 2024-07-23 ENCOUNTER — HOSPITAL ENCOUNTER (EMERGENCY)
Facility: HOSPITAL | Age: 84
Discharge: HOME | End: 2024-07-23
Payer: MEDICARE

## 2024-07-23 VITALS
RESPIRATION RATE: 21 BRPM | DIASTOLIC BLOOD PRESSURE: 73 MMHG | SYSTOLIC BLOOD PRESSURE: 136 MMHG | WEIGHT: 170 LBS | HEIGHT: 67 IN | HEART RATE: 80 BPM | OXYGEN SATURATION: 99 % | BODY MASS INDEX: 26.68 KG/M2 | TEMPERATURE: 98.7 F

## 2024-07-23 DIAGNOSIS — I71.21 ANEURYSM OF ASCENDING AORTA WITHOUT RUPTURE (HCC): ICD-10-CM

## 2024-07-23 DIAGNOSIS — R05.9 COUGH, UNSPECIFIED TYPE: ICD-10-CM

## 2024-07-23 DIAGNOSIS — I10 ESSENTIAL HYPERTENSION: ICD-10-CM

## 2024-07-23 DIAGNOSIS — J92.9 PLEURAL PLAQUE: ICD-10-CM

## 2024-07-23 DIAGNOSIS — U07.1 COVID-19: Primary | ICD-10-CM

## 2024-07-23 LAB
ALBUMIN SERPL-MCNC: 3.5 G/DL (ref 3.4–5)
ALBUMIN/GLOB SERPL: 0.8 (ref 0.8–1.7)
ALP SERPL-CCNC: 84 U/L (ref 45–117)
ALT SERPL-CCNC: 10 U/L (ref 16–61)
ANION GAP SERPL CALC-SCNC: 4 MMOL/L (ref 3–18)
AST SERPL-CCNC: 14 U/L (ref 10–38)
BASOPHILS # BLD: 0 K/UL (ref 0–0.1)
BASOPHILS NFR BLD: 0 % (ref 0–2)
BILIRUB SERPL-MCNC: 0.4 MG/DL (ref 0.2–1)
BUN SERPL-MCNC: 17 MG/DL (ref 7–18)
BUN/CREAT SERPL: 14 (ref 12–20)
CALCIUM SERPL-MCNC: 8.8 MG/DL (ref 8.5–10.1)
CHLORIDE SERPL-SCNC: 106 MMOL/L (ref 100–111)
CO2 SERPL-SCNC: 28 MMOL/L (ref 21–32)
CREAT SERPL-MCNC: 1.21 MG/DL (ref 0.6–1.3)
D DIMER PPP FEU-MCNC: 0.97 UG/ML(FEU)
DIFFERENTIAL METHOD BLD: ABNORMAL
EOSINOPHIL # BLD: 0.1 K/UL (ref 0–0.4)
EOSINOPHIL NFR BLD: 2 % (ref 0–5)
ERYTHROCYTE [DISTWIDTH] IN BLOOD BY AUTOMATED COUNT: 15.4 % (ref 11.6–14.5)
FLUAV RNA SPEC QL NAA+PROBE: NOT DETECTED
FLUBV RNA SPEC QL NAA+PROBE: NOT DETECTED
GLOBULIN SER CALC-MCNC: 4.2 G/DL (ref 2–4)
GLUCOSE SERPL-MCNC: 91 MG/DL (ref 74–99)
HCT VFR BLD AUTO: 43.5 % (ref 36–48)
HGB BLD-MCNC: 14.4 G/DL (ref 13–16)
IMM GRANULOCYTES # BLD AUTO: 0 K/UL (ref 0–0.04)
IMM GRANULOCYTES NFR BLD AUTO: 0 % (ref 0–0.5)
LYMPHOCYTES # BLD: 0.9 K/UL (ref 0.9–3.6)
LYMPHOCYTES NFR BLD: 11 % (ref 21–52)
MAGNESIUM SERPL-MCNC: 2.2 MG/DL (ref 1.6–2.6)
MCH RBC QN AUTO: 30.9 PG (ref 24–34)
MCHC RBC AUTO-ENTMCNC: 33.1 G/DL (ref 31–37)
MCV RBC AUTO: 93.3 FL (ref 78–100)
MONOCYTES # BLD: 1.1 K/UL (ref 0.05–1.2)
MONOCYTES NFR BLD: 13 % (ref 3–10)
NEUTS SEG # BLD: 6.2 K/UL (ref 1.8–8)
NEUTS SEG NFR BLD: 74 % (ref 40–73)
NRBC # BLD: 0 K/UL (ref 0–0.01)
NRBC BLD-RTO: 0 PER 100 WBC
NT PRO BNP: 59 PG/ML (ref 0–1800)
PLATELET # BLD AUTO: 132 K/UL (ref 135–420)
PMV BLD AUTO: 12.2 FL (ref 9.2–11.8)
POTASSIUM SERPL-SCNC: 4.4 MMOL/L (ref 3.5–5.5)
PROT SERPL-MCNC: 7.7 G/DL (ref 6.4–8.2)
RBC # BLD AUTO: 4.66 M/UL (ref 4.35–5.65)
S PYO AG THROAT QL: NEGATIVE
SARS-COV-2 RNA RESP QL NAA+PROBE: DETECTED
SODIUM SERPL-SCNC: 138 MMOL/L (ref 136–145)
TROPONIN I SERPL HS-MCNC: 13 NG/L (ref 0–78)
WBC # BLD AUTO: 8.4 K/UL (ref 4.6–13.2)

## 2024-07-23 PROCEDURE — 83880 ASSAY OF NATRIURETIC PEPTIDE: CPT

## 2024-07-23 PROCEDURE — 84484 ASSAY OF TROPONIN QUANT: CPT

## 2024-07-23 PROCEDURE — 94760 N-INVAS EAR/PLS OXIMETRY 1: CPT

## 2024-07-23 PROCEDURE — 87880 STREP A ASSAY W/OPTIC: CPT

## 2024-07-23 PROCEDURE — 85379 FIBRIN DEGRADATION QUANT: CPT

## 2024-07-23 PROCEDURE — 71046 X-RAY EXAM CHEST 2 VIEWS: CPT

## 2024-07-23 PROCEDURE — 87636 SARSCOV2 & INF A&B AMP PRB: CPT

## 2024-07-23 PROCEDURE — 85025 COMPLETE CBC W/AUTO DIFF WBC: CPT

## 2024-07-23 PROCEDURE — 83735 ASSAY OF MAGNESIUM: CPT

## 2024-07-23 PROCEDURE — 6370000000 HC RX 637 (ALT 250 FOR IP): Performed by: NURSE PRACTITIONER

## 2024-07-23 PROCEDURE — 71275 CT ANGIOGRAPHY CHEST: CPT

## 2024-07-23 PROCEDURE — 6360000004 HC RX CONTRAST MEDICATION: Performed by: NURSE PRACTITIONER

## 2024-07-23 PROCEDURE — 99285 EMERGENCY DEPT VISIT HI MDM: CPT

## 2024-07-23 PROCEDURE — 87070 CULTURE OTHR SPECIMN AEROBIC: CPT

## 2024-07-23 PROCEDURE — 80053 COMPREHEN METABOLIC PANEL: CPT

## 2024-07-23 RX ORDER — BENZONATATE 100 MG/1
200 CAPSULE ORAL
Status: COMPLETED | OUTPATIENT
Start: 2024-07-23 | End: 2024-07-23

## 2024-07-23 RX ADMIN — BENZONATATE 200 MG: 100 CAPSULE ORAL at 15:20

## 2024-07-23 RX ADMIN — IOPAMIDOL 100 ML: 755 INJECTION, SOLUTION INTRAVENOUS at 13:53

## 2024-07-23 NOTE — ED TRIAGE NOTES
Pt presents to ED due to burning pain in his chest when he coughs. Pt states he has some SOB that is chronic in nature. Dry cough has been present for the last month. Co that he feels like something is in his throat

## 2024-07-23 NOTE — ED PROVIDER NOTES
[facility] EMERGENCY DEPT  EMERGENCY DEPARTMENT ENCOUNTER       Pt Name: Sahil Diallo  MRN: [id number]  Birthdate 1940  Date of evaluation: 7/23/2024  PCP: Ju Lynn MD  Note Started: 3:42 PM 7/23/24     CHIEF COMPLAINT       Chief Complaint   Patient presents with    Cough     Pt co of burning pain in chest while coughing, cough has been present for the last month. Chest pain for the last week        HISTORY OF PRESENT ILLNESS: 1 or more elements      History From: Patient  HPI Limitations: None  Chronic Conditions: Hypertension  Social Determinants affecting Dx or Tx: None    Sahil Diallo is a 83 y.o. male with history of DVT (when he had COVID, not currently on blood thinner), hypertension who presents to ED c/o cough and shortness of breath.  Patient reports a nonproductive cough for approximately 1 month and shortness of breath.  Patient states he does have baseline shortness of breath and this is not much worse than usual.  Patient reports a burning pain with cough but denies chest pain otherwise.  Patient also reports globus sensation states \"I feel like if I could just get that 1 good cough I could get it out.\"  No difficulty swallowing or managing secretions, no pharyngitis.  No headache, no dizziness, no fever/chills, no focal weakness or paresthesias, no congestion, no leg pain or swelling.     Nursing Notes were all reviewed and agreed with or any disagreements were addressed in the HPI.    PAST HISTORY     Past Medical History:  Past Medical History:   Diagnosis Date    Cataract 2021    both eyes, had surgery on the right in 7/2023    Chronic cough 2017    not on meds, resolved    Heart murmur 2021    H/O in [place], patient reported that he was not aware of it    History of COVID-19 12/2020    Hospitalized for 7days    Hx of blood clots 12/2020    right popliteal vein while hopsitalized with covid19, was treated with Xarelto in the past    Hypertension 2019    on meds, managed by

## 2024-07-25 LAB
BACTERIA SPEC CULT: NORMAL
SERVICE CMNT-IMP: NORMAL

## 2024-08-07 LAB
EKG ATRIAL RATE: 79 BPM
EKG DIAGNOSIS: NORMAL
EKG P AXIS: 53 DEGREES
EKG P-R INTERVAL: 186 MS
EKG Q-T INTERVAL: 382 MS
EKG QRS DURATION: 144 MS
EKG QTC CALCULATION (BAZETT): 438 MS
EKG R AXIS: -62 DEGREES
EKG T AXIS: 26 DEGREES
EKG VENTRICULAR RATE: 79 BPM

## (undated) DEVICE — STRAP,POSITIONING,KNEE/BODY,FOAM,4X60": Brand: MEDLINE

## (undated) DEVICE — 3M™ TEGADERM™ TRANSPARENT FILM DRESSING FRAME STYLE, 1624W, 2-3/8 IN X 2-3/4 IN (6 CM X 7 CM), 100/CT 4CT/CASE: Brand: 3M™ TEGADERM™

## (undated) DEVICE — Device

## (undated) DEVICE — MICROSURGICAL INSTRUMENT HYDRODISSECTION CANNULA 27GA, 1.57MM BEND (AKAHOSHI STYLE): Brand: ALCON

## (undated) DEVICE — SATINSLIT® KNIFE 2.75MM ANGLED: Brand: SATINSLIT®

## (undated) DEVICE — CANNULA FRM 27GA 7 8IN

## (undated) DEVICE — SOLUTION IRRIGATION BAL SALT SOLUTION 500 ML STRL BSS

## (undated) DEVICE — SOLUTION IRRIG 1000ML H2O STRL BLT